# Patient Record
Sex: FEMALE | Race: BLACK OR AFRICAN AMERICAN | NOT HISPANIC OR LATINO | URBAN - METROPOLITAN AREA
[De-identification: names, ages, dates, MRNs, and addresses within clinical notes are randomized per-mention and may not be internally consistent; named-entity substitution may affect disease eponyms.]

---

## 2024-09-10 ENCOUNTER — APPOINTMENT (OUTPATIENT)
Dept: OBSTETRICS AND GYNECOLOGY | Facility: CLINIC | Age: 19
End: 2024-09-10

## 2024-09-12 ENCOUNTER — APPOINTMENT (OUTPATIENT)
Dept: OBSTETRICS AND GYNECOLOGY | Facility: CLINIC | Age: 19
End: 2024-09-12

## 2024-09-25 ENCOUNTER — APPOINTMENT (OUTPATIENT)
Dept: OBSTETRICS AND GYNECOLOGY | Facility: CLINIC | Age: 19
End: 2024-09-25
Payer: COMMERCIAL

## 2024-12-28 ENCOUNTER — APPOINTMENT (OUTPATIENT)
Dept: PRIMARY CARE | Facility: CLINIC | Age: 19
End: 2024-12-28

## 2025-01-16 ENCOUNTER — APPOINTMENT (OUTPATIENT)
Dept: OBSTETRICS AND GYNECOLOGY | Facility: CLINIC | Age: 20
End: 2025-01-16

## 2025-01-16 ENCOUNTER — LAB (OUTPATIENT)
Dept: LAB | Facility: LAB | Age: 20
End: 2025-01-16
Payer: COMMERCIAL

## 2025-01-16 VITALS — BODY MASS INDEX: 20.92 KG/M2 | WEIGHT: 133.6 LBS | SYSTOLIC BLOOD PRESSURE: 106 MMHG | DIASTOLIC BLOOD PRESSURE: 60 MMHG

## 2025-01-16 DIAGNOSIS — Z32.01 PREGNANCY TEST POSITIVE (HHS-HCC): ICD-10-CM

## 2025-01-16 DIAGNOSIS — Z34.02 ENCOUNTER FOR SUPERVISION OF NORMAL FIRST PREGNANCY IN SECOND TRIMESTER: Primary | ICD-10-CM

## 2025-01-16 LAB
ABO GROUP (TYPE) IN BLOOD: NORMAL
ANTIBODY SCREEN: NORMAL
ERYTHROCYTE [DISTWIDTH] IN BLOOD BY AUTOMATED COUNT: 20.7 % (ref 11.5–14.5)
EST. AVERAGE GLUCOSE BLD GHB EST-MCNC: 105 MG/DL
FERRITIN SERPL-MCNC: 23 NG/ML
HBA1C MFR BLD: 5.3 %
HBV SURFACE AG SERPL QL IA: NONREACTIVE
HCT VFR BLD AUTO: 26.4 % (ref 36–46)
HCV AB SER QL: NONREACTIVE
HGB BLD-MCNC: 8.2 G/DL (ref 12–16)
HIV 1+2 AB+HIV1 P24 AG SERPL QL IA: NONREACTIVE
IRON SATN MFR SERPL: NORMAL %
IRON SERPL-MCNC: 26 UG/DL
MCH RBC QN AUTO: 21.2 PG (ref 26–34)
MCHC RBC AUTO-ENTMCNC: 31.1 G/DL (ref 32–36)
MCV RBC AUTO: 68 FL (ref 80–100)
NRBC BLD-RTO: 0 /100 WBCS (ref 0–0)
PLATELET # BLD AUTO: 383 X10*3/UL (ref 150–450)
PREGNANCY TEST URINE, POC: POSITIVE
RBC # BLD AUTO: 3.86 X10*6/UL (ref 4–5.2)
REFLEX ADDED, ANEMIA PANEL: NORMAL
RH FACTOR (ANTIGEN D): NORMAL
RUBV IGG SERPL IA-ACNC: 1.5 IA
RUBV IGG SERPL QL IA: POSITIVE
TIBC SERPL-MCNC: NORMAL UG/DL
TREPONEMA PALLIDUM IGG+IGM AB [PRESENCE] IN SERUM OR PLASMA BY IMMUNOASSAY: NONREACTIVE
UIBC SERPL-MCNC: >450 UG/DL
WBC # BLD AUTO: 12.8 X10*3/UL (ref 4.4–11.3)

## 2025-01-16 PROCEDURE — 81025 URINE PREGNANCY TEST: CPT | Performed by: OBSTETRICS & GYNECOLOGY

## 2025-01-16 PROCEDURE — 86850 RBC ANTIBODY SCREEN: CPT

## 2025-01-16 PROCEDURE — 85027 COMPLETE CBC AUTOMATED: CPT

## 2025-01-16 PROCEDURE — 83550 IRON BINDING TEST: CPT

## 2025-01-16 PROCEDURE — 86803 HEPATITIS C AB TEST: CPT

## 2025-01-16 PROCEDURE — 87186 SC STD MICRODIL/AGAR DIL: CPT

## 2025-01-16 PROCEDURE — 87591 N.GONORRHOEAE DNA AMP PROB: CPT

## 2025-01-16 PROCEDURE — 86780 TREPONEMA PALLIDUM: CPT

## 2025-01-16 PROCEDURE — 83020 HEMOGLOBIN ELECTROPHORESIS: CPT | Performed by: OBSTETRICS & GYNECOLOGY

## 2025-01-16 PROCEDURE — 87491 CHLMYD TRACH DNA AMP PROBE: CPT

## 2025-01-16 PROCEDURE — 87389 HIV-1 AG W/HIV-1&-2 AB AG IA: CPT

## 2025-01-16 PROCEDURE — 83036 HEMOGLOBIN GLYCOSYLATED A1C: CPT

## 2025-01-16 PROCEDURE — 83021 HEMOGLOBIN CHROMOTOGRAPHY: CPT

## 2025-01-16 PROCEDURE — 86901 BLOOD TYPING SEROLOGIC RH(D): CPT

## 2025-01-16 PROCEDURE — 88175 CYTOPATH C/V AUTO FLUID REDO: CPT

## 2025-01-16 PROCEDURE — 82728 ASSAY OF FERRITIN: CPT

## 2025-01-16 PROCEDURE — 0500F INITIAL PRENATAL CARE VISIT: CPT | Performed by: OBSTETRICS & GYNECOLOGY

## 2025-01-16 PROCEDURE — 87086 URINE CULTURE/COLONY COUNT: CPT

## 2025-01-16 PROCEDURE — 86900 BLOOD TYPING SEROLOGIC ABO: CPT

## 2025-01-16 PROCEDURE — 87340 HEPATITIS B SURFACE AG IA: CPT

## 2025-01-16 PROCEDURE — 88141 CYTOPATH C/V INTERPRET: CPT | Performed by: PATHOLOGY

## 2025-01-16 PROCEDURE — 86317 IMMUNOASSAY INFECTIOUS AGENT: CPT

## 2025-01-16 RX ORDER — ASPIRIN 81 MG/1
162 TABLET ORAL DAILY
Qty: 180 TABLET | Refills: 3 | Status: SHIPPED | OUTPATIENT
Start: 2025-01-16 | End: 2026-01-16

## 2025-01-16 ASSESSMENT — EDINBURGH POSTNATAL DEPRESSION SCALE (EPDS)
THE THOUGHT OF HARMING MYSELF HAS OCCURRED TO ME: NEVER
I HAVE FELT SAD OR MISERABLE: NOT VERY OFTEN
I HAVE BEEN SO UNHAPPY THAT I HAVE BEEN CRYING: ONLY OCCASIONALLY
I HAVE FELT SCARED OR PANICKY FOR NO GOOD REASON: YES, SOMETIMES
I HAVE BLAMED MYSELF UNNECESSARILY WHEN THINGS WENT WRONG: YES, SOME OF THE TIME
I HAVE BEEN ABLE TO LAUGH AND SEE THE FUNNY SIDE OF THINGS: AS MUCH AS I ALWAYS COULD
TOTAL SCORE: 9
I HAVE BEEN ANXIOUS OR WORRIED FOR NO GOOD REASON: YES, SOMETIMES
THINGS HAVE BEEN GETTING ON TOP OF ME: NO, MOST OF THE TIME I HAVE COPED QUITE WELL
I HAVE LOOKED FORWARD WITH ENJOYMENT TO THINGS: AS MUCH AS I EVER DID
I HAVE BEEN SO UNHAPPY THAT I HAVE HAD DIFFICULTY SLEEPING: NOT AT ALL

## 2025-01-16 ASSESSMENT — ENCOUNTER SYMPTOMS
DEPRESSION: 0
GASTROINTESTINAL NEGATIVE: 0
OCCASIONAL FEELINGS OF UNSTEADINESS: 0
EYES NEGATIVE: 0
NEUROLOGICAL NEGATIVE: 0
ENDOCRINE NEGATIVE: 0
HEMATOLOGIC/LYMPHATIC NEGATIVE: 0
RESPIRATORY NEGATIVE: 0
LOSS OF SENSATION IN FEET: 0
MUSCULOSKELETAL NEGATIVE: 0
PSYCHIATRIC NEGATIVE: 0
CONSTITUTIONAL NEGATIVE: 0
ALLERGIC/IMMUNOLOGIC NEGATIVE: 0
CARDIOVASCULAR NEGATIVE: 0

## 2025-01-16 NOTE — PATIENT INSTRUCTIONS
Coagulations on your pregnancy!      Review the new OB information provided today.     Return to the office every 4 weeks the beginning parts of pregnancy.  Your visits with close together as the pregnancy progresses.      Arrange to have an ultrasound performed to check your due date/find out how far along you are.      A prescription for prenatal vitamin has been sent to your pharmacy.  Take 1 tablet daily.      A prescription for baby aspirin has been sent to your pharmacy.  Take 2 baby aspirin each day.  This will help decrease the risk of preeclampsia-high blood pressure with protein in your urine in the latter part of pregnancy.      Arrange to have your routine prenatal labs drawn today.      Follow-up with one of our Genetics Counselors about you and your partner's families history of autism, sickle cell disease and your partner's brother born with a heart defect.    Feel free to call the office with any problems, questions or concerns prior to next scheduled visit.

## 2025-01-17 DIAGNOSIS — O99.012 ANEMIA DURING PREGNANCY IN SECOND TRIMESTER (HHS-HCC): Primary | ICD-10-CM

## 2025-01-17 LAB
C TRACH RRNA SPEC QL NAA+PROBE: NEGATIVE
N GONORRHOEA DNA SPEC QL PROBE+SIG AMP: NEGATIVE

## 2025-01-17 RX ORDER — DOCUSATE SODIUM 100 MG/1
100 CAPSULE, LIQUID FILLED ORAL 2 TIMES DAILY PRN
Qty: 30 CAPSULE | Refills: 5 | Status: SHIPPED | OUTPATIENT
Start: 2025-01-17

## 2025-01-17 RX ORDER — FERROUS SULFATE 325(65) MG
325 TABLET, DELAYED RELEASE (ENTERIC COATED) ORAL
Qty: 90 TABLET | Refills: 11 | Status: SHIPPED | OUTPATIENT
Start: 2025-01-17 | End: 2026-01-17

## 2025-01-18 LAB
HEMOGLOBIN A2: 3.4 % (ref 2–3.5)
HEMOGLOBIN A: 62.3 % (ref 95.8–98)
HEMOGLOBIN F: 0.3 % (ref 0–2)
HEMOGLOBIN IDENTIFICATION INTERPRETATION: ABNORMAL
HEMOGLOBIN S: 34 %
PATH REVIEW-HGB IDENTIFICATION: ABNORMAL

## 2025-01-19 DIAGNOSIS — O23.42 URINARY TRACT INFECTION IN MOTHER DURING SECOND TRIMESTER OF PREGNANCY (HHS-HCC): Primary | ICD-10-CM

## 2025-01-19 LAB — BACTERIA UR CULT: ABNORMAL

## 2025-01-19 RX ORDER — NITROFURANTOIN 25; 75 MG/1; MG/1
100 CAPSULE ORAL 2 TIMES DAILY
Qty: 14 CAPSULE | Refills: 0 | Status: SHIPPED | OUTPATIENT
Start: 2025-01-19 | End: 2025-01-26

## 2025-01-20 ENCOUNTER — HOSPITAL ENCOUNTER (OUTPATIENT)
Dept: RADIOLOGY | Facility: CLINIC | Age: 20
Discharge: HOME | End: 2025-01-20
Payer: COMMERCIAL

## 2025-01-20 DIAGNOSIS — Z32.01 PREGNANCY TEST POSITIVE (HHS-HCC): ICD-10-CM

## 2025-01-20 PROCEDURE — 76815 OB US LIMITED FETUS(S): CPT

## 2025-01-20 PROCEDURE — 76815 OB US LIMITED FETUS(S): CPT | Performed by: OBSTETRICS & GYNECOLOGY

## 2025-01-22 LAB
CYTOLOGY CMNT CVX/VAG CYTO-IMP: NORMAL
LAB AP HPV HR: NORMAL
LABORATORY COMMENT REPORT: NORMAL
LMP START DATE: NORMAL
MENSTRUAL HX REPORTED: NORMAL
PATH REPORT.TOTAL CANCER: NORMAL

## 2025-01-24 NOTE — PROGRESS NOTES
20 yo  presents for a NOB visit of a desired pregnancy.     A/P: NOB     -  NOB packet     -  PNL     -  RTC Q 4 weeks     -  D/W the patient our practice     -  PNV rx     -  bASA rx     -  Genetics referral

## 2025-01-29 ENCOUNTER — CLINICAL SUPPORT (OUTPATIENT)
Dept: GENETICS | Facility: CLINIC | Age: 20
End: 2025-01-29
Payer: COMMERCIAL

## 2025-01-29 ENCOUNTER — APPOINTMENT (OUTPATIENT)
Dept: GENETICS | Facility: CLINIC | Age: 20
End: 2025-01-29
Payer: COMMERCIAL

## 2025-01-29 VITALS
BODY MASS INDEX: 20.88 KG/M2 | SYSTOLIC BLOOD PRESSURE: 114 MMHG | HEART RATE: 91 BPM | WEIGHT: 133 LBS | DIASTOLIC BLOOD PRESSURE: 71 MMHG | HEIGHT: 67 IN

## 2025-01-29 DIAGNOSIS — Z31.430 ENCOUNTER OF FEMALE FOR TESTING FOR GENETIC DISEASE CARRIER STATUS FOR PROCREATIVE MANAGEMENT: ICD-10-CM

## 2025-01-29 DIAGNOSIS — D57.3 SICKLE CELL TRAIT (CMS-HCC): ICD-10-CM

## 2025-01-29 DIAGNOSIS — Z32.01 PREGNANCY TEST POSITIVE (HHS-HCC): ICD-10-CM

## 2025-01-29 DIAGNOSIS — Z31.5 ENCOUNTER FOR PROCREATIVE GENETIC COUNSELING: ICD-10-CM

## 2025-01-29 LAB
COMMENTS - MP RESULT TYPE: NORMAL
SCAN RESULT: NORMAL

## 2025-01-29 PROCEDURE — 96041 GENETIC COUNSELING SVC EA 30: CPT

## 2025-01-29 ASSESSMENT — PAIN SCALES - GENERAL: PAINLEVEL_OUTOF10: 0-NO PAIN

## 2025-01-29 NOTE — PROGRESS NOTES
"Thank you for the referral of Ms. Gabby Neves. she is a 19 y.o.,  female who was 16 and 1/7 weeks pregnant at the time of our appointment with an EDC of 07/15/2025.  She was seen for genetic counseling with her partnerfore due to Sickle Cell Trait status, as well as family history concerns. Patient was seen in coordination with HARISH Rodriguez, genetic counseling .     PAST HISTORY:   This is the couple's first pregnancy together. Patient's partner has ADHD, and no other reported health concerns.     Ultrasounds in the current pregnancy:  2025:  \"Assigned GA15 w + 6 d  Assigned KIKA:2025  Fetal Growth Overview  =================  Exam date        GA              BPD (mm)          HC (mm)              AC (mm)          FL (mm)             HL (mm)        EFW (g)  2025        15w 6d        31.8     50%        120.4    42%        94     41%        18.9    35%                             130    26%  Impression  =========  Dating ultrasound was performed.  -Gestational age assigned by fetal biometry as LMP unsure and differs by 7 days.  -The anatomic survey cannot be completely performed at this gestational age, however no gross abnormalities are suspected.  A follow up exam is needed to perform an anatomic survey.     Thank you for allowing us to participate in the care of your patient\"    Prior aneuploidy screening:  Normal cell free DNA screening reported on 2025:      Prior carrier screening:  Abnormal hemoglobin identification, positive for Sickle Cell Trait.  Low MCV (68 fL, ref range  fL) on prior CBC.  No additional carrier screening has been reported.    Patient otherwise denies complications such as bleeding or cramping, exposures, infection/illness, and travel outside of the [place] since finding out she was pregnant.    FAMILY HISTORY  Medical and family histories were reviewed and the following concerns regarding this pregnancy were apparent:  Patient:  -Full " brother (living, 20) with Autism, ADHD, and asthma.  -Maternal half brother and maternal half sister (living) with asthma, ADHD, and depression.  -Mother (living, 48) with anemia and asthma.  -Maternal grandmother (living) with gout and diabetes.  -Maternal grandfather (, 47)  of colon cancer.  Partner:    -Full brother (living, 27) was born with a congenital heart defect. He had surgery to correct this heart defect.  -Maternal half sister (living, 14) has Sickle Cell disease. She had her spleen removed and has ongoing symptoms of Sickle Cell.   -Mother (living, 48) has Sickle Cell trait and ongoing anemia.   -Maternal first cousin has Sickle Cell disease.  -Maternal grandmother (, late 60s) had cancer that was reportedly caused by smoking history.     The remainder of the family history was negative for birth defects, intellectual disability, recurrent pregnancy loss, or recognized inherited conditions.  Consanguinity denied.    REPORTED ETHNICITY/RACE:  Patient:   Patient's partner:      COUNSELING:    The following information was discussed with your patient:    The general population risk of 3-5% for birth defects in every pregnancy.  The patient already had normal prenatal cell-free DNA screening.  We discussed the methodology for this testing, which includes using cell-free DNA obtained from a mother's blood to screen for the presence of common chromosomal abnormalities. Depending on the laboratory used, there is a >99% sensitivity for Down syndrome, at least 97.4% sensitivity for trisomy 18, and at least 87.5% sensitivity for trisomy 13.  Specificity for these trisomies is >99%. Although sensitivity and specificity rates are high, not all positive results are confirmed to be true positives. False negative results are rare.   Sickle Cell trait. Patient has known Sickle Cell trait. We reviewed common features of Sickle Cell Disease, including its autosomal  recessive inheritance. In order for the current pregnancy or any future children to be affected, both parents have to have Sickle Cell trait. Each carrier parent would have a 50% chance of passing down the non-working copy of the gene, and therefore parents who are carriers of the same genetic risks have a 1 in 4 (25%) chance of having an affected child. There is a baseline 1 in 2 risk that Ms. Neves's partner has Sickle Cell trait based on his maternal half sister being affected with Sickle Cell disease. This means that the current pregnancy has a 1 in 8 chance of being affected with the current information.   If her partner tests POSITIVE for Sickle Cell trait, the risk will increase to 25%. If he tests NEGATIVE, it will be reduced to <1%.   Patient requested information from the American Sickle Cell Anemia Association for consideration of free testing for her partner. They are located at [address] [place] [address]. Her partner can set up an appointment for free screening by calling [phone].  The availability of diagnostic fetal chromosome analysis and/or diagnostic single gene analysis via amniocentesis. The methods, benefits, limitations and risks of amniocentesis and a 1 in 400 risk of complications, including a 1 in 800 risk of miscarriage. This is the only way to confirm a diagnosis of a genetic condition prenatally. All children born in [place] are screened for Sickle Cell disease at birth via the [facility] Screen.  Per updated ACOG recommendations from 2017, we discussed the availability, benefits, and limitations of carrier screening for cystic fibrosis (CF), spinal muscular atrophy (SMA), and hemoglobinopathies/thalassemias. We reviewed the carrier frequencies of these conditions, varied clinical manifestations, and their autosomal recessive inheritance. If both members of the couple are found to be carriers for the same condition, there may be a 25% chance for an affected child  and prenatal diagnosis would be available. Negative carrier screening does not rule out the possibility of being a carrier. [place] screening is available for CF, hemoglobinopathies, and thalassemias, and SMA.   In accordance with the most recent carrier screening guidelines from ACMG published in , we also discussed the availability of more expanded carrier screening for additional, primarily autosomal recessive, and some X-linked conditions. Current ACMG guidelines recommend all pregnant patients and those planning a pregnancy should be offered Tier 3 carrier screening. Tier 3 carrier screening includes conditions with a carrier frequency >=1/200 in any ethnic group with reasonable representation in the [place]. Larger panels that include conditions less common than those in Tier 3 are also available, and are considered Tier 4 screening. We discussed the pros and cons of expanded carrier screening including the higher likelihood of being identified as a carrier for at least one condition on a larger panel. Approximately 4% of couples are found to be at risk to have a child with a genetic disorder based on expanded carrier screening. In rare cases, expanded carrier screening results may have health implications for the tested individual.   Additional family history concerns:  Autism. Ms. Neves's brother has Autism. Autism may occur as part of a chromosome abnormality or single gene disorder.  When isolated, it is most often thought to result from a combination of genes and environment, also referred to as multifactorial causes. Without further information it is difficult to predict risk of autism for the current pregnancy, but may be increased above that of the general population. A general genetics evaluation is recommended for any individual with autism and an appointment at [facility] can be considered for the affected relative, and can be made by calling [phone].  If an underlying  genetic etiology in the family is determined, precise recurrence risks could be provided, and testing for other family members may be available if clinically indicated.  Multifactorial inheritance. We reviewed that conditions such as asthma, ADHD, mental health concerns, and later onset cancers are often considered multifactorial, meaning that they are due to some combination of genetic and environmental risk factors. While we do not have specific testing to assess exact risk, we know that relatives of affected individuals have an elevated risk when compared to the general population of being similarly affected. Serenity should inform her doctors, as well as any future pediatricians, of this family history to allow for early intervention and to maximize outcomes.   Young cancer diagnoses in relatives. When an individual is diagnosed with colon cancer before the age of 50, they meet the National Comprehensive Cancer Network (NCCN) guidelines for testing of cancer predisposition genes. First and second degree relatives are also eligible for this testing to determine if there is a genetic predisposition or reason for young cancer diagnoses in a family. The patient may be at increased risk of having a mutation if her family member does. [facility] accepts self-referrals to the Cancer Genetics clinic at  by calling [phone].   Family history of congenital heart defect. We reviewed that heart defects are present in about 1% of people. It is unclear if there is a strong genetic link between patient's partner's brother's diagnosis and a genetic condition given the limited information about the defect. The current pregnancy may have a higher risk when compared to the general population.       DISPOSITION:  The patient stated that she understood the above information and elected to proceed with carrier screening for 14 conditions (including the 5 recommended by ACOG) via [employer]. This panel was  selected as she would like the opportunity to screen for additional conditions in the future if she so desires. [employer] allows a free reflex to additional genes in the future. Patient's partner, Jennifer, also elected to proceed with the 14-gene carrier screening panel. He is unsure if he has active health insurance, so he was also given information on free Sickle Cell screening ONLY through the American Sickle Cell Anemia Association. Results for carrier screening will be available in 2-3 weeks, at which time they will be reviewed with the couple..     Thank you for allowing us to participate in the care of your patient.  Should you or your patient have any questions, please do not hesitate to contact our office at [phone].    Total time spent on day of encounter: 65 minutes (45 minutes with patient, 20 minutes on pre/post patient care activities, including documentation).    Sincerely,   Karyna Bagley MS, [facility]  Licensed and Certified Genetic Counselor    Reviewed by:  Kathi Saunders MD  [facility]

## 2025-02-11 ENCOUNTER — HOSPITAL ENCOUNTER (OUTPATIENT)
Dept: RADIOLOGY | Facility: CLINIC | Age: 20
Discharge: HOME | End: 2025-02-11
Payer: COMMERCIAL

## 2025-02-11 DIAGNOSIS — Z32.01 PREGNANCY TEST POSITIVE (HHS-HCC): ICD-10-CM

## 2025-02-11 PROCEDURE — 76805 OB US >/= 14 WKS SNGL FETUS: CPT

## 2025-02-11 PROCEDURE — 76811 OB US DETAILED SNGL FETUS: CPT

## 2025-02-11 PROCEDURE — 76811 OB US DETAILED SNGL FETUS: CPT | Performed by: OBSTETRICS & GYNECOLOGY

## 2025-02-19 ENCOUNTER — APPOINTMENT (OUTPATIENT)
Dept: OBSTETRICS AND GYNECOLOGY | Facility: CLINIC | Age: 20
End: 2025-02-19
Payer: COMMERCIAL

## 2025-02-19 VITALS — SYSTOLIC BLOOD PRESSURE: 122 MMHG | BODY MASS INDEX: 21.46 KG/M2 | WEIGHT: 137 LBS | DIASTOLIC BLOOD PRESSURE: 70 MMHG

## 2025-02-19 DIAGNOSIS — Z34.02 ENCOUNTER FOR SUPERVISION OF NORMAL FIRST PREGNANCY IN SECOND TRIMESTER: Primary | ICD-10-CM

## 2025-02-19 DIAGNOSIS — O99.012 ANEMIA DURING PREGNANCY IN SECOND TRIMESTER (HHS-HCC): ICD-10-CM

## 2025-02-19 DIAGNOSIS — M54.9 BACK PAIN IN PREGNANCY (HHS-HCC): ICD-10-CM

## 2025-02-19 DIAGNOSIS — O99.891 BACK PAIN IN PREGNANCY (HHS-HCC): ICD-10-CM

## 2025-02-19 PROCEDURE — 0501F PRENATAL FLOW SHEET: CPT | Performed by: OBSTETRICS & GYNECOLOGY

## 2025-02-19 ASSESSMENT — ENCOUNTER SYMPTOMS
NEUROLOGICAL NEGATIVE: 0
MUSCULOSKELETAL NEGATIVE: 0
RESPIRATORY NEGATIVE: 0
PSYCHIATRIC NEGATIVE: 0
HEMATOLOGIC/LYMPHATIC NEGATIVE: 0
CONSTITUTIONAL NEGATIVE: 0
ENDOCRINE NEGATIVE: 0
EYES NEGATIVE: 0
ALLERGIC/IMMUNOLOGIC NEGATIVE: 0
GASTROINTESTINAL NEGATIVE: 0
CARDIOVASCULAR NEGATIVE: 0

## 2025-02-19 NOTE — PROGRESS NOTES
"She reports HA, that have resolved. She is compliant with her iron therapy and bASA. She has noted some constipation.     She works form home 8 hours a day \"sitting in a chair and has back pain and some sciatic pain\".     She has a h/o heavy VB before pregnancy, ?the etiology for severe anemia during pregnancy.     A/P: ALESHIA - late first visit, SCT, back pain, anemia    -  CBC today     -  Continue bASA     -  PT referral     -  Nutrition in pregnancy and list of iron rich foods     -  FOB to get SCT testing     -  SMA and CF testing ordered   "

## 2025-03-12 ENCOUNTER — APPOINTMENT (OUTPATIENT)
Dept: LAB | Facility: HOSPITAL | Age: 20
End: 2025-03-12
Payer: COMMERCIAL

## 2025-03-12 PROCEDURE — 81220 CFTR GENE COM VARIANTS: CPT

## 2025-03-12 PROCEDURE — 81329 SMN1 GENE DOS/DELETION ALYS: CPT

## 2025-03-19 ENCOUNTER — APPOINTMENT (OUTPATIENT)
Dept: OBSTETRICS AND GYNECOLOGY | Facility: CLINIC | Age: 20
End: 2025-03-19
Payer: COMMERCIAL

## 2025-03-19 VITALS — SYSTOLIC BLOOD PRESSURE: 102 MMHG | WEIGHT: 135 LBS | BODY MASS INDEX: 21.14 KG/M2 | DIASTOLIC BLOOD PRESSURE: 68 MMHG

## 2025-03-19 DIAGNOSIS — M54.9 BACK PAIN IN PREGNANCY (HHS-HCC): ICD-10-CM

## 2025-03-19 DIAGNOSIS — O99.012 ANEMIA DURING PREGNANCY IN SECOND TRIMESTER (HHS-HCC): ICD-10-CM

## 2025-03-19 DIAGNOSIS — Z34.02 ENCOUNTER FOR SUPERVISION OF NORMAL FIRST PREGNANCY IN SECOND TRIMESTER: Primary | ICD-10-CM

## 2025-03-19 DIAGNOSIS — O99.012 ANEMIA DURING PREGNANCY IN SECOND TRIMESTER: ICD-10-CM

## 2025-03-19 DIAGNOSIS — O99.891 BACK PAIN IN PREGNANCY (HHS-HCC): ICD-10-CM

## 2025-03-19 DIAGNOSIS — Z34.02 ENCOUNTER FOR SUPERVISION OF NORMAL FIRST PREGNANCY IN SECOND TRIMESTER: ICD-10-CM

## 2025-03-19 LAB
CFTR MUT ANL BLD/T: NORMAL
ELECTRONICALLY SIGNED BY: NORMAL

## 2025-03-19 PROCEDURE — 0501F PRENATAL FLOW SHEET: CPT | Performed by: OBSTETRICS & GYNECOLOGY

## 2025-03-19 ASSESSMENT — ENCOUNTER SYMPTOMS
CARDIOVASCULAR NEGATIVE: 0
EYES NEGATIVE: 0
RESPIRATORY NEGATIVE: 0
ALLERGIC/IMMUNOLOGIC NEGATIVE: 0
PSYCHIATRIC NEGATIVE: 0
HEMATOLOGIC/LYMPHATIC NEGATIVE: 0
ENDOCRINE NEGATIVE: 0
MUSCULOSKELETAL NEGATIVE: 0
NEUROLOGICAL NEGATIVE: 0
CONSTITUTIONAL NEGATIVE: 0
GASTROINTESTINAL NEGATIVE: 0

## 2025-03-19 NOTE — PROGRESS NOTES
She reports lots of fetal movement and she is without symptoms of PTL or PEC.      She was trying to do her second trimester labs but there is a long wait. The lab sent her to the car and then had her come back, but stated that she missed her slot.     She reports compliance with her iron therapy, baby aspirin and prenatal vitamins.  She states that overall she is feeling well.    A/P: ALESHIA - late first visit, anemia, low BMI    -  2nd trimester labs at next visit     -  3rd trimester Check List w/glucose instructions     -  RTC 4 weeks

## 2025-03-20 LAB
ELECTRONICALLY SIGNED BY: NORMAL
SMA RESULT: NORMAL

## 2025-04-16 ENCOUNTER — APPOINTMENT (OUTPATIENT)
Dept: LAB | Facility: HOSPITAL | Age: 20
End: 2025-04-16
Payer: COMMERCIAL

## 2025-04-16 ENCOUNTER — APPOINTMENT (OUTPATIENT)
Dept: OBSTETRICS AND GYNECOLOGY | Facility: CLINIC | Age: 20
End: 2025-04-16
Payer: COMMERCIAL

## 2025-04-16 VITALS — WEIGHT: 142 LBS | BODY MASS INDEX: 22.24 KG/M2 | DIASTOLIC BLOOD PRESSURE: 60 MMHG | SYSTOLIC BLOOD PRESSURE: 120 MMHG

## 2025-04-16 DIAGNOSIS — Z34.02 ENCOUNTER FOR SUPERVISION OF NORMAL FIRST PREGNANCY IN SECOND TRIMESTER: Primary | ICD-10-CM

## 2025-04-16 DIAGNOSIS — M54.9 BACK PAIN IN PREGNANCY (HHS-HCC): ICD-10-CM

## 2025-04-16 DIAGNOSIS — O99.012 ANEMIA DURING PREGNANCY IN SECOND TRIMESTER: ICD-10-CM

## 2025-04-16 DIAGNOSIS — O99.891 BACK PAIN IN PREGNANCY (HHS-HCC): ICD-10-CM

## 2025-04-16 LAB
ERYTHROCYTE [DISTWIDTH] IN BLOOD BY AUTOMATED COUNT: 23 % (ref 11.5–14.5)
FERRITIN SERPL-MCNC: 13 NG/ML
FOLATE SERPL-MCNC: 12 NG/ML
HCT VFR BLD AUTO: 28.4 % (ref 36–46)
HGB BLD-MCNC: 8.4 G/DL (ref 12–16)
IRON SATN MFR SERPL: NORMAL %
IRON SERPL-MCNC: 28 UG/DL
MCH RBC QN AUTO: 24.4 PG (ref 26–34)
MCHC RBC AUTO-ENTMCNC: 29.6 G/DL (ref 32–36)
MCV RBC AUTO: 83 FL (ref 80–100)
NRBC BLD-RTO: 0 /100 WBCS (ref 0–0)
PLATELET # BLD AUTO: 391 X10*3/UL (ref 150–450)
RBC # BLD AUTO: 3.44 X10*6/UL (ref 4–5.2)
REFLEX ADDED, ANEMIA PANEL: NORMAL
TIBC SERPL-MCNC: NORMAL UG/DL
UIBC SERPL-MCNC: >450 UG/DL
VIT B12 SERPL-MCNC: 436 PG/ML
WBC # BLD AUTO: 12.8 X10*3/UL (ref 4.4–11.3)

## 2025-04-16 PROCEDURE — 0501F PRENATAL FLOW SHEET: CPT | Performed by: OBSTETRICS & GYNECOLOGY

## 2025-04-16 PROCEDURE — 82746 ASSAY OF FOLIC ACID SERUM: CPT

## 2025-04-16 PROCEDURE — 83550 IRON BINDING TEST: CPT

## 2025-04-16 PROCEDURE — 82728 ASSAY OF FERRITIN: CPT

## 2025-04-16 PROCEDURE — 82607 VITAMIN B-12: CPT

## 2025-04-16 PROCEDURE — 85027 COMPLETE CBC AUTOMATED: CPT

## 2025-04-16 NOTE — PROGRESS NOTES
"She reports good FM and is w/o sx of PTL or PEC.     She reports resolving mild URI sx and has questions about safe meds to use for \"mucous\".     She has meet with a .     A/P: ALESHIA - anemia, late first visit, resolving URI, low BMI w/fair maternal weight gain    -  RTC 2 weeks     -  Fetal movement info     -  Safe meds in pregnancy info     -  Prenatal tour info     -  [employer]    -  2nd trimester labs today  "

## 2025-04-20 LAB
GLUCOSE 1H P 50 G GLC PO SERPL-MCNC: 77 MG/DL
T PALLIDUM AB SER QL IA: NEGATIVE

## 2025-05-01 ENCOUNTER — APPOINTMENT (OUTPATIENT)
Dept: OBSTETRICS AND GYNECOLOGY | Facility: CLINIC | Age: 20
End: 2025-05-01
Payer: COMMERCIAL

## 2025-05-09 ENCOUNTER — APPOINTMENT (OUTPATIENT)
Dept: OBSTETRICS AND GYNECOLOGY | Facility: CLINIC | Age: 20
End: 2025-05-09
Payer: COMMERCIAL

## 2025-05-09 VITALS — WEIGHT: 146.8 LBS | SYSTOLIC BLOOD PRESSURE: 114 MMHG | BODY MASS INDEX: 22.99 KG/M2 | DIASTOLIC BLOOD PRESSURE: 60 MMHG

## 2025-05-09 DIAGNOSIS — O26.849 FETAL SIZE INCONSISTENT WITH DATES (HHS-HCC): ICD-10-CM

## 2025-05-09 DIAGNOSIS — Z34.03 SUPERVISION OF NORMAL FIRST PREGNANCY IN THIRD TRIMESTER: Primary | ICD-10-CM

## 2025-05-09 DIAGNOSIS — O99.013 ANEMIA DURING PREGNANCY IN THIRD TRIMESTER: ICD-10-CM

## 2025-05-09 PROCEDURE — 0501F PRENATAL FLOW SHEET: CPT | Performed by: OBSTETRICS & GYNECOLOGY

## 2025-05-09 ASSESSMENT — ENCOUNTER SYMPTOMS
NEUROLOGICAL NEGATIVE: 0
HEMATOLOGIC/LYMPHATIC NEGATIVE: 0
GASTROINTESTINAL NEGATIVE: 0
PSYCHIATRIC NEGATIVE: 0
CARDIOVASCULAR NEGATIVE: 0
ENDOCRINE NEGATIVE: 0
ALLERGIC/IMMUNOLOGIC NEGATIVE: 0
RESPIRATORY NEGATIVE: 0
CONSTITUTIONAL NEGATIVE: 0
EYES NEGATIVE: 0
MUSCULOSKELETAL NEGATIVE: 0

## 2025-05-09 NOTE — LETTER
5/9/2025    To Whom It May Concern:    Gabby Neves is being followed for her pregnancy at [facility].  Estimated Date of Delivery: 7/8/25    Sincerely,        Mayte Gonzalez MD  [facility]

## 2025-05-09 NOTE — PROGRESS NOTES
"She reports good FM and is w/o sx of PTL or PEC.     She reports \"getting sick again\". But \"went to an Urgent Care and tested negative for Covid and 2 types of Flu\". She has been using her OTC previously discussed allergy meds intermittently - ie occasional Flonase or Claritin. Her sx have decreased, but persist.     She reports compliance with iron and has \"added some other herbal products to increase her iron.     A/P: HROB - anemia, S<D, low BMI    -  F/U US for growth     -  Fetal movement info     -  3rd tri check list given again and reviewed     -  [facility] given again    -  CBC, will need iron transfusion unless improvement noted    -  Peds info   "

## 2025-05-15 ENCOUNTER — HOSPITAL ENCOUNTER (OUTPATIENT)
Dept: RADIOLOGY | Facility: HOSPITAL | Age: 20
Discharge: HOME | End: 2025-05-15
Payer: COMMERCIAL

## 2025-05-15 ENCOUNTER — LAB (OUTPATIENT)
Dept: LAB | Facility: HOSPITAL | Age: 20
End: 2025-05-15
Payer: COMMERCIAL

## 2025-05-15 DIAGNOSIS — O26.849 FETAL SIZE INCONSISTENT WITH DATES (HHS-HCC): ICD-10-CM

## 2025-05-15 DIAGNOSIS — O99.013 ANEMIA COMPLICATING PREGNANCY, THIRD TRIMESTER: Primary | ICD-10-CM

## 2025-05-15 LAB
ERYTHROCYTE [DISTWIDTH] IN BLOOD BY AUTOMATED COUNT: 20.3 % (ref 11.5–14.5)
FERRITIN SERPL-MCNC: 15 NG/ML
FOLATE SERPL-MCNC: >24 NG/ML
HCT VFR BLD AUTO: 29.5 % (ref 36–46)
HGB BLD-MCNC: 8.9 G/DL (ref 12–16)
IRON SATN MFR SERPL: 37 %
IRON SERPL-MCNC: 209 UG/DL
MCH RBC QN AUTO: 25.6 PG (ref 26–34)
MCHC RBC AUTO-ENTMCNC: 30.2 G/DL (ref 32–36)
MCV RBC AUTO: 85 FL (ref 80–100)
NRBC BLD-RTO: 0 /100 WBCS (ref 0–0)
PLATELET # BLD AUTO: 323 X10*3/UL (ref 150–450)
RBC # BLD AUTO: 3.48 X10*6/UL (ref 4–5.2)
REFLEX ADDED, ANEMIA PANEL: NORMAL
TIBC SERPL-MCNC: 572 UG/DL
UIBC SERPL-MCNC: 363 UG/DL
VIT B12 SERPL-MCNC: 380 PG/ML
WBC # BLD AUTO: 11.3 X10*3/UL (ref 4.4–11.3)

## 2025-05-15 PROCEDURE — 83550 IRON BINDING TEST: CPT

## 2025-05-15 PROCEDURE — 36415 COLL VENOUS BLD VENIPUNCTURE: CPT

## 2025-05-15 PROCEDURE — 82728 ASSAY OF FERRITIN: CPT

## 2025-05-15 PROCEDURE — 82607 VITAMIN B-12: CPT

## 2025-05-15 PROCEDURE — 82746 ASSAY OF FOLIC ACID SERUM: CPT

## 2025-05-15 PROCEDURE — 85027 COMPLETE CBC AUTOMATED: CPT

## 2025-05-15 PROCEDURE — 76816 OB US FOLLOW-UP PER FETUS: CPT

## 2025-05-19 ENCOUNTER — APPOINTMENT (OUTPATIENT)
Dept: OBSTETRICS AND GYNECOLOGY | Facility: CLINIC | Age: 20
End: 2025-05-19
Payer: COMMERCIAL

## 2025-05-19 VITALS — DIASTOLIC BLOOD PRESSURE: 60 MMHG | SYSTOLIC BLOOD PRESSURE: 100 MMHG | WEIGHT: 146.9 LBS | BODY MASS INDEX: 23.01 KG/M2

## 2025-05-19 DIAGNOSIS — O36.5930 POOR FETAL GROWTH AFFECTING MANAGEMENT OF MOTHER IN THIRD TRIMESTER, SINGLE OR UNSPECIFIED FETUS (HHS-HCC): ICD-10-CM

## 2025-05-19 DIAGNOSIS — O09.93: Primary | ICD-10-CM

## 2025-05-19 PROCEDURE — 0501F PRENATAL FLOW SHEET: CPT | Performed by: OBSTETRICS & GYNECOLOGY

## 2025-05-19 NOTE — PROGRESS NOTES
She reports lots of FM and is w/o sx of PTL or PEC. She reports compliance with po iron 2x/day.     She is starting to get ready for delivery. She toured the [facility] and has a tour of [employer] scheduled.     A/P: HROB - anemia, FGR, low BMI, late first visit    -  RTC Q week for testing     -  Fetal movement d/w the patient     -  Q 3 week F/U US for growth       -  Iron infusion d/w the patient

## 2025-05-23 ENCOUNTER — HOSPITAL ENCOUNTER (OUTPATIENT)
Dept: RADIOLOGY | Facility: HOSPITAL | Age: 20
Discharge: HOME | End: 2025-05-23
Payer: COMMERCIAL

## 2025-05-23 DIAGNOSIS — O26.849 FETAL SIZE INCONSISTENT WITH DATES (HHS-HCC): ICD-10-CM

## 2025-05-23 PROCEDURE — 76815 OB US LIMITED FETUS(S): CPT

## 2025-05-23 PROCEDURE — 76820 UMBILICAL ARTERY ECHO: CPT

## 2025-05-28 ENCOUNTER — APPOINTMENT (OUTPATIENT)
Dept: OBSTETRICS AND GYNECOLOGY | Facility: CLINIC | Age: 20
End: 2025-05-28
Payer: COMMERCIAL

## 2025-05-28 VITALS — BODY MASS INDEX: 23.81 KG/M2 | SYSTOLIC BLOOD PRESSURE: 102 MMHG | WEIGHT: 152 LBS | DIASTOLIC BLOOD PRESSURE: 60 MMHG

## 2025-05-28 DIAGNOSIS — M54.9 BACK PAIN IN PREGNANCY (HHS-HCC): ICD-10-CM

## 2025-05-28 DIAGNOSIS — O09.93: Primary | ICD-10-CM

## 2025-05-28 DIAGNOSIS — O36.5930 POOR FETAL GROWTH AFFECTING MANAGEMENT OF MOTHER IN THIRD TRIMESTER, SINGLE OR UNSPECIFIED FETUS (HHS-HCC): ICD-10-CM

## 2025-05-28 DIAGNOSIS — O99.891 BACK PAIN IN PREGNANCY (HHS-HCC): ICD-10-CM

## 2025-05-28 DIAGNOSIS — O99.013 ANEMIA DURING PREGNANCY IN THIRD TRIMESTER: ICD-10-CM

## 2025-05-28 PROCEDURE — 0501F PRENATAL FLOW SHEET: CPT | Performed by: OBSTETRICS & GYNECOLOGY

## 2025-05-28 ASSESSMENT — ENCOUNTER SYMPTOMS
CONSTITUTIONAL NEGATIVE: 0
MUSCULOSKELETAL NEGATIVE: 0
NEUROLOGICAL NEGATIVE: 0
PSYCHIATRIC NEGATIVE: 0
ENDOCRINE NEGATIVE: 0
ALLERGIC/IMMUNOLOGIC NEGATIVE: 0
GASTROINTESTINAL NEGATIVE: 0
HEMATOLOGIC/LYMPHATIC NEGATIVE: 0
CARDIOVASCULAR NEGATIVE: 0
EYES NEGATIVE: 0
RESPIRATORY NEGATIVE: 0

## 2025-05-30 ENCOUNTER — APPOINTMENT (OUTPATIENT)
Dept: RADIOLOGY | Facility: HOSPITAL | Age: 20
End: 2025-05-30
Payer: COMMERCIAL

## 2025-06-01 NOTE — PROGRESS NOTES
She reports good FM and is w/o sx of PTL or PEC.     She is ready for delivery.     A/P: HROB - FGR, anemia    -  RTC Q week     -  Weekly BPP, AFV and dopplers    -  IOL by 39 weeks    -  Recheck CBC at next visit

## 2025-06-10 ENCOUNTER — APPOINTMENT (OUTPATIENT)
Dept: RADIOLOGY | Facility: CLINIC | Age: 20
End: 2025-06-10
Payer: COMMERCIAL

## 2025-06-11 ENCOUNTER — HOSPITAL ENCOUNTER (OUTPATIENT)
Dept: RADIOLOGY | Facility: CLINIC | Age: 20
Discharge: HOME | End: 2025-06-11
Payer: COMMERCIAL

## 2025-06-11 DIAGNOSIS — O26.849 FETAL SIZE INCONSISTENT WITH DATES (HHS-HCC): ICD-10-CM

## 2025-06-11 PROCEDURE — 76816 OB US FOLLOW-UP PER FETUS: CPT

## 2025-06-11 PROCEDURE — 76820 UMBILICAL ARTERY ECHO: CPT

## 2025-06-11 PROCEDURE — 76820 UMBILICAL ARTERY ECHO: CPT | Performed by: OBSTETRICS & GYNECOLOGY

## 2025-06-11 PROCEDURE — 76816 OB US FOLLOW-UP PER FETUS: CPT | Performed by: OBSTETRICS & GYNECOLOGY

## 2025-06-11 PROCEDURE — 76819 FETAL BIOPHYS PROFIL W/O NST: CPT | Performed by: OBSTETRICS & GYNECOLOGY

## 2025-06-12 ENCOUNTER — APPOINTMENT (OUTPATIENT)
Dept: OBSTETRICS AND GYNECOLOGY | Facility: CLINIC | Age: 20
End: 2025-06-12
Payer: COMMERCIAL

## 2025-06-12 ENCOUNTER — LAB (OUTPATIENT)
Dept: LAB | Facility: HOSPITAL | Age: 20
End: 2025-06-12
Payer: COMMERCIAL

## 2025-06-12 VITALS — SYSTOLIC BLOOD PRESSURE: 120 MMHG | BODY MASS INDEX: 23.81 KG/M2 | DIASTOLIC BLOOD PRESSURE: 82 MMHG | WEIGHT: 152 LBS

## 2025-06-12 DIAGNOSIS — O99.891 BACK PAIN IN PREGNANCY (HHS-HCC): ICD-10-CM

## 2025-06-12 DIAGNOSIS — M54.9 BACK PAIN IN PREGNANCY (HHS-HCC): ICD-10-CM

## 2025-06-12 DIAGNOSIS — O99.013 ANEMIA DURING PREGNANCY IN THIRD TRIMESTER: ICD-10-CM

## 2025-06-12 DIAGNOSIS — O09.93: Primary | ICD-10-CM

## 2025-06-12 DIAGNOSIS — O99.013 ANEMIA COMPLICATING PREGNANCY, THIRD TRIMESTER: Primary | ICD-10-CM

## 2025-06-12 DIAGNOSIS — O36.5930 POOR FETAL GROWTH AFFECTING MANAGEMENT OF MOTHER IN THIRD TRIMESTER, SINGLE OR UNSPECIFIED FETUS (HHS-HCC): ICD-10-CM

## 2025-06-12 LAB
ERYTHROCYTE [DISTWIDTH] IN BLOOD BY AUTOMATED COUNT: 18.9 % (ref 11.5–14.5)
FERRITIN SERPL-MCNC: 21 NG/ML
FOLATE SERPL-MCNC: >24 NG/ML
HCT VFR BLD AUTO: 30.9 % (ref 36–46)
HGB BLD-MCNC: 9.9 G/DL (ref 12–16)
IRON SATN MFR SERPL: 41 %
IRON SERPL-MCNC: 230 UG/DL
MCH RBC QN AUTO: 27.3 PG (ref 26–34)
MCHC RBC AUTO-ENTMCNC: 32 G/DL (ref 32–36)
MCV RBC AUTO: 85 FL (ref 80–100)
NRBC BLD-RTO: 0.2 /100 WBCS (ref 0–0)
PLATELET # BLD AUTO: 295 X10*3/UL (ref 150–450)
RBC # BLD AUTO: 3.63 X10*6/UL (ref 4–5.2)
REFLEX ADDED, ANEMIA PANEL: NORMAL
TIBC SERPL-MCNC: 561 UG/DL
UIBC SERPL-MCNC: 331 UG/DL
VIT B12 SERPL-MCNC: 365 PG/ML
WBC # BLD AUTO: 11.1 X10*3/UL (ref 4.4–11.3)

## 2025-06-12 PROCEDURE — 83550 IRON BINDING TEST: CPT

## 2025-06-12 PROCEDURE — 82728 ASSAY OF FERRITIN: CPT

## 2025-06-12 PROCEDURE — 85027 COMPLETE CBC AUTOMATED: CPT

## 2025-06-12 PROCEDURE — 0501F PRENATAL FLOW SHEET: CPT | Performed by: OBSTETRICS & GYNECOLOGY

## 2025-06-12 PROCEDURE — 36415 COLL VENOUS BLD VENIPUNCTURE: CPT

## 2025-06-12 PROCEDURE — 82746 ASSAY OF FOLIC ACID SERUM: CPT

## 2025-06-12 PROCEDURE — 82607 VITAMIN B-12: CPT

## 2025-06-12 ASSESSMENT — PATIENT HEALTH QUESTIONNAIRE - PHQ9
1. LITTLE INTEREST OR PLEASURE IN DOING THINGS: NOT AT ALL
2. FEELING DOWN, DEPRESSED OR HOPELESS: NOT AT ALL
SUM OF ALL RESPONSES TO PHQ9 QUESTIONS 1 & 2: 0

## 2025-06-12 NOTE — PROGRESS NOTES
She reports good fetal movement.  She is without symptoms of  labor or preeclampsia.    She reports compliance with her iron therapy.  However, we discussed an iron infusion if her level has not significantly improved.    She is ready for delivery.  She has questions about taking CB classes.    A/P: HROB - FGR, anemia     -  Weekly BPP and dopplers    -  Fetal movement     -  CBC today     -  GBS today, if not significant improvement will do iron infusion    -  Car seat check info     -  CB class and [employer] info

## 2025-06-13 ENCOUNTER — TELEPHONE (OUTPATIENT)
Dept: OBSTETRICS AND GYNECOLOGY | Facility: CLINIC | Age: 20
End: 2025-06-13
Payer: COMMERCIAL

## 2025-06-13 ENCOUNTER — DOCUMENTATION (OUTPATIENT)
Dept: OBSTETRICS AND GYNECOLOGY | Facility: CLINIC | Age: 20
End: 2025-06-13
Payer: COMMERCIAL

## 2025-06-13 DIAGNOSIS — O99.013 ANEMIA AFFECTING PREGNANCY IN THIRD TRIMESTER: Primary | ICD-10-CM

## 2025-06-13 RX ORDER — HEPARIN SODIUM,PORCINE/PF 10 UNIT/ML
50 SYRINGE (ML) INTRAVENOUS AS NEEDED
OUTPATIENT
Start: 2025-06-13

## 2025-06-13 RX ORDER — FAMOTIDINE 10 MG/ML
20 INJECTION, SOLUTION INTRAVENOUS ONCE AS NEEDED
OUTPATIENT
Start: 2025-06-13

## 2025-06-13 RX ORDER — ALBUTEROL SULFATE 0.83 MG/ML
3 SOLUTION RESPIRATORY (INHALATION) AS NEEDED
OUTPATIENT
Start: 2025-06-13

## 2025-06-13 RX ORDER — HEPARIN 100 UNIT/ML
500 SYRINGE INTRAVENOUS AS NEEDED
OUTPATIENT
Start: 2025-06-13

## 2025-06-13 RX ORDER — DIPHENHYDRAMINE HYDROCHLORIDE 50 MG/ML
50 INJECTION, SOLUTION INTRAMUSCULAR; INTRAVENOUS AS NEEDED
OUTPATIENT
Start: 2025-06-13

## 2025-06-13 RX ORDER — EPINEPHRINE 0.3 MG/.3ML
0.3 INJECTION SUBCUTANEOUS EVERY 5 MIN PRN
OUTPATIENT
Start: 2025-06-13

## 2025-06-13 NOTE — TELEPHONE ENCOUNTER
Pt verified by name and .  Pt is aware Dr. Gonzalez is recommending an iron infusion.  Pt is aware [facility] will be reaching out to her.  Pt has no questions at this time.

## 2025-06-14 LAB — GP B STREP SPEC QL CULT: ABNORMAL

## 2025-06-16 ENCOUNTER — APPOINTMENT (OUTPATIENT)
Dept: OBSTETRICS AND GYNECOLOGY | Facility: CLINIC | Age: 20
End: 2025-06-16
Payer: COMMERCIAL

## 2025-06-16 VITALS — BODY MASS INDEX: 23.7 KG/M2 | WEIGHT: 151.3 LBS | DIASTOLIC BLOOD PRESSURE: 65 MMHG | SYSTOLIC BLOOD PRESSURE: 100 MMHG

## 2025-06-16 DIAGNOSIS — O36.5930 POOR FETAL GROWTH AFFECTING MANAGEMENT OF MOTHER IN THIRD TRIMESTER, SINGLE OR UNSPECIFIED FETUS (HHS-HCC): ICD-10-CM

## 2025-06-16 DIAGNOSIS — O09.93: Primary | ICD-10-CM

## 2025-06-16 DIAGNOSIS — O99.891 BACK PAIN IN PREGNANCY (HHS-HCC): ICD-10-CM

## 2025-06-16 DIAGNOSIS — O99.013 ANEMIA DURING PREGNANCY IN THIRD TRIMESTER: ICD-10-CM

## 2025-06-16 DIAGNOSIS — M54.9 BACK PAIN IN PREGNANCY (HHS-HCC): ICD-10-CM

## 2025-06-16 PROCEDURE — 0501F PRENATAL FLOW SHEET: CPT | Performed by: OBSTETRICS & GYNECOLOGY

## 2025-06-16 NOTE — PROGRESS NOTES
She reports good FM and is w/o sx of PTL or PEC. She has questions about her GBS+ cx     A/P: HROB - FGR, anemia     -  RTC Q week     -  Weekly BPP and dopplers     -  Iron infusion ordered     -  39 week IOL at [facility]     -  GBS d/w the patient

## 2025-06-20 ENCOUNTER — HOSPITAL ENCOUNTER (OUTPATIENT)
Dept: RADIOLOGY | Facility: CLINIC | Age: 20
Discharge: HOME | End: 2025-06-20
Payer: COMMERCIAL

## 2025-06-20 DIAGNOSIS — O36.5930 INTRAUTERINE GROWTH RESTRICTION (IUGR) AFFECTING CARE OF MOTHER, THIRD TRIMESTER, SINGLE GESTATION: ICD-10-CM

## 2025-06-20 DIAGNOSIS — O26.849 FETAL SIZE INCONSISTENT WITH DATES (HHS-HCC): ICD-10-CM

## 2025-06-20 PROCEDURE — 76819 FETAL BIOPHYS PROFIL W/O NST: CPT

## 2025-06-20 PROCEDURE — 76815 OB US LIMITED FETUS(S): CPT

## 2025-06-26 ENCOUNTER — INFUSION (OUTPATIENT)
Dept: INFUSION THERAPY | Facility: HOSPITAL | Age: 20
End: 2025-06-26
Payer: COMMERCIAL

## 2025-06-26 VITALS
RESPIRATION RATE: 18 BRPM | WEIGHT: 155 LBS | DIASTOLIC BLOOD PRESSURE: 76 MMHG | TEMPERATURE: 97.2 F | SYSTOLIC BLOOD PRESSURE: 114 MMHG | OXYGEN SATURATION: 98 % | HEIGHT: 67 IN | HEART RATE: 80 BPM | BODY MASS INDEX: 24.33 KG/M2

## 2025-06-26 DIAGNOSIS — O09.93: ICD-10-CM

## 2025-06-26 DIAGNOSIS — O99.891 BACK PAIN IN PREGNANCY (HHS-HCC): ICD-10-CM

## 2025-06-26 DIAGNOSIS — O36.5931 POOR FETAL GROWTH AFFECTING MANAGEMENT OF MOTHER IN THIRD TRIMESTER, FETUS 1 OF MULTIPLE GESTATION (HHS-HCC): ICD-10-CM

## 2025-06-26 DIAGNOSIS — M54.9 BACK PAIN IN PREGNANCY (HHS-HCC): ICD-10-CM

## 2025-06-26 DIAGNOSIS — O99.013 ANEMIA AFFECTING PREGNANCY IN THIRD TRIMESTER: ICD-10-CM

## 2025-06-26 PROCEDURE — 96366 THER/PROPH/DIAG IV INF ADDON: CPT

## 2025-06-26 PROCEDURE — 96365 THER/PROPH/DIAG IV INF INIT: CPT

## 2025-06-26 PROCEDURE — 2500000004 HC RX 250 GENERAL PHARMACY W/ HCPCS (ALT 636 FOR OP/ED): Mod: JZ,TB | Performed by: OBSTETRICS & GYNECOLOGY

## 2025-06-26 RX ORDER — EPINEPHRINE 0.3 MG/.3ML
0.3 INJECTION SUBCUTANEOUS EVERY 5 MIN PRN
OUTPATIENT
Start: 2025-06-26

## 2025-06-26 RX ORDER — FAMOTIDINE 10 MG/ML
20 INJECTION, SOLUTION INTRAVENOUS ONCE AS NEEDED
OUTPATIENT
Start: 2025-06-26

## 2025-06-26 RX ORDER — ALBUTEROL SULFATE 0.83 MG/ML
3 SOLUTION RESPIRATORY (INHALATION) AS NEEDED
OUTPATIENT
Start: 2025-06-26

## 2025-06-26 RX ORDER — DIPHENHYDRAMINE HYDROCHLORIDE 50 MG/ML
50 INJECTION, SOLUTION INTRAMUSCULAR; INTRAVENOUS AS NEEDED
OUTPATIENT
Start: 2025-06-26

## 2025-06-26 RX ADMIN — SODIUM CHLORIDE 25 MG: 9 INJECTION, SOLUTION INTRAVENOUS at 10:07

## 2025-06-26 RX ADMIN — SODIUM CHLORIDE 975 MG: 0.9 INJECTION, SOLUTION INTRAVENOUS at 10:27

## 2025-06-26 ASSESSMENT — PAIN SCALES - GENERAL
PAINLEVEL_OUTOF10: 0-NO PAIN

## 2025-06-26 NOTE — PROGRESS NOTES
"[facility] Infusion Clinic Note   Date: 2025   Name: Gabby Neves  : 2005   MRN: [id number]         Reason for Visit: IRON DEXTRAN VISIT      Accompanied by:Self   Visit Type:: Infusion   Diagnosis: Anemia affecting pregnancy in third trimester    Pregnancy, high-risk, third trimester (HHS-HCC)    Poor fetal growth affecting management of mother in third trimester, fetus 1 of multiple gestation (HHS-HCC)    Back pain in pregnancy (HHS-HCC)    Allergies:   Allergies as of 2025    (No Known Allergies)      Current [facility] has a current medication list which includes the following prescription(s): aspirin, docusate sodium, and ferrous sulfate, and the following Facility-Administered Medications: iron dextran complex (Infed) 975 mg in sodium chloride 0.9% 500 mL IV.        Vitals:  Vitals:    25 0845 25 1012 25 1018 25 1022   BP: 112/73 107/72 112/72 118/73   BP Location: Left arm Left arm Left arm Left arm   Patient Position: Sitting Sitting Sitting Sitting   BP Cuff Size: Large adult long Adult Adult Large adult   Pulse: 69 62 64 66   Resp: 18 18 18 18   Temp: 36.6 °C (97.8 °F) 36.3 °C (97.3 °F) 36.4 °C (97.6 °F) 36.5 °C (97.7 °F)   TempSrc: Temporal Temporal Temporal Temporal   SpO2: 98% 99% 98% 98%   Weight: 70.3 kg (155 lb)      Height: 1.702 m (5' 7\")         Infusion Pre-procedure Checklist   Allergies reviewed: YES   Medications reviewed: yes   Contraindications to treatment:NO   Previous reaction to current treatment:No   Current Health Issues: None and Pregnancy   Pain: 0-no pain [0]'    Contraindications based on patient history: No   Provider notified: No      Steadi Fall Risk  One or more falls in the last year? No  How many Times?    Was the patient injured in the fall?    Has trouble stepping onto curb?    Advised to use a cane or walker to get around safely?    Often has to rush to toilet?    Feels unsteady when walking?  "   Has lost some feeling in feet?    Often feels sad or depressed?    Steadies self on furniture while walking at home?    Takes medicine that makes them feel lightheaded or more tired than usual?    Worried about Falling?    Takes medicine to sleep or improve mood?    Needs to push with hands when rising from a chair?        Negative for complaint: [] all other systems have been reviewed and are negative for complaint   Infusion Readiness:   Assessment Concerns Related to Infusion: No  Provider notified: yes Dr Guallpa  Assess patient for the concerns below. Document provider notification as appropriate:  - Does not meet criteria to treat No  - Has an active or recent infection with/without current antibiotic use No  - Has recent/planned dental work No  - Has recent/planned surgeries No  - Has recently received or plans to receive vaccinations No  - Has treatment related toxicities No  - Is pregnant (unless noted otherwise) Yes      We administered iron dextran complex (Infed) 25 mg in sodium chloride 0.9% 50 mL IV and iron dextran complex (Infed) 975 mg in sodium chloride 0.9% 500 mL IV.     Patient remained in office for 30 additional minutes after infusion stopped to assess for reaction.   Patient remained stable the entire 30 minutes.   Patient tolerated infusion well, no reactions or complications.   Patient discharged home ambulatory.

## 2025-06-27 ENCOUNTER — HOSPITAL ENCOUNTER (OUTPATIENT)
Dept: RADIOLOGY | Facility: CLINIC | Age: 20
Discharge: HOME | End: 2025-06-27
Payer: COMMERCIAL

## 2025-06-27 DIAGNOSIS — O36.5990 FETAL GROWTH RESTRICTION ANTEPARTUM (HHS-HCC): ICD-10-CM

## 2025-06-27 DIAGNOSIS — Z32.00 VISIT FOR CONFIRMATION OF PREGNANCY TEST RESULT WITH PHYSICAL EXAM: ICD-10-CM

## 2025-06-27 DIAGNOSIS — O99.013 ANEMIA IN PREGNANCY, THIRD TRIMESTER: ICD-10-CM

## 2025-06-27 PROCEDURE — 76815 OB US LIMITED FETUS(S): CPT

## 2025-06-27 PROCEDURE — 76820 UMBILICAL ARTERY ECHO: CPT

## 2025-06-30 ENCOUNTER — TELEPHONE (OUTPATIENT)
Dept: OBSTETRICS AND GYNECOLOGY | Facility: CLINIC | Age: 20
End: 2025-06-30

## 2025-06-30 ENCOUNTER — APPOINTMENT (OUTPATIENT)
Dept: OBSTETRICS AND GYNECOLOGY | Facility: CLINIC | Age: 20
End: 2025-06-30
Payer: COMMERCIAL

## 2025-06-30 VITALS — SYSTOLIC BLOOD PRESSURE: 115 MMHG | WEIGHT: 158 LBS | DIASTOLIC BLOOD PRESSURE: 75 MMHG | BODY MASS INDEX: 24.75 KG/M2

## 2025-06-30 DIAGNOSIS — O09.93: Primary | ICD-10-CM

## 2025-06-30 DIAGNOSIS — K62.5 RECTAL BLEEDING: ICD-10-CM

## 2025-06-30 DIAGNOSIS — O99.013 ANEMIA DURING PREGNANCY IN THIRD TRIMESTER: ICD-10-CM

## 2025-06-30 DIAGNOSIS — O36.5930 POOR FETAL GROWTH AFFECTING MANAGEMENT OF MOTHER IN THIRD TRIMESTER, SINGLE OR UNSPECIFIED FETUS (HHS-HCC): ICD-10-CM

## 2025-06-30 DIAGNOSIS — Z34.90 ENCOUNTER FOR INDUCTION OF LABOR: ICD-10-CM

## 2025-06-30 DIAGNOSIS — M54.9 BACK PAIN IN PREGNANCY (HHS-HCC): ICD-10-CM

## 2025-06-30 DIAGNOSIS — O99.891 BACK PAIN IN PREGNANCY (HHS-HCC): ICD-10-CM

## 2025-06-30 PROCEDURE — 59426 ANTEPARTUM CARE ONLY: CPT | Performed by: OBSTETRICS & GYNECOLOGY

## 2025-06-30 NOTE — TELEPHONE ENCOUNTER
Pt verified by name and .  Pt is aware her IOL is scheduled on 2025 at 6 PM at [facility].  Pt requesting Thursday or Friday.  Pt is aware nurse will call surgery scheduling.  Per surgery scheduling there are no openings.  Nurse left message for pt to return call.

## 2025-07-01 ENCOUNTER — PREP FOR PROCEDURE (OUTPATIENT)
Dept: OBSTETRICS AND GYNECOLOGY | Facility: CLINIC | Age: 20
End: 2025-07-01
Payer: COMMERCIAL

## 2025-07-01 DIAGNOSIS — O36.5930 POOR FETAL GROWTH AFFECTING MANAGEMENT OF MOTHER IN THIRD TRIMESTER, SINGLE OR UNSPECIFIED FETUS (HHS-HCC): ICD-10-CM

## 2025-07-01 DIAGNOSIS — O09.93: Primary | ICD-10-CM

## 2025-07-01 DIAGNOSIS — O99.013 ANEMIA DURING PREGNANCY IN THIRD TRIMESTER: ICD-10-CM

## 2025-07-05 ENCOUNTER — HOSPITAL ENCOUNTER (INPATIENT)
Facility: HOSPITAL | Age: 20
LOS: 1 days | Discharge: HOME | End: 2025-07-06
Attending: OBSTETRICS & GYNECOLOGY | Admitting: ADVANCED PRACTICE MIDWIFE
Payer: COMMERCIAL

## 2025-07-05 PROBLEM — Z3A.39 39 WEEKS GESTATION OF PREGNANCY (HHS-HCC): Status: ACTIVE | Noted: 2025-07-05

## 2025-07-05 LAB
ABO GROUP (TYPE) IN BLOOD: NORMAL
ANTIBODY SCREEN: NORMAL
ERYTHROCYTE [DISTWIDTH] IN BLOOD BY AUTOMATED COUNT: 18.8 % (ref 11.5–14.5)
HCT VFR BLD AUTO: 33.9 % (ref 36–46)
HGB BLD-MCNC: 11.4 G/DL (ref 12–16)
MCH RBC QN AUTO: 28.6 PG (ref 26–34)
MCHC RBC AUTO-ENTMCNC: 33.6 G/DL (ref 32–36)
MCV RBC AUTO: 85 FL (ref 80–100)
NRBC BLD-RTO: 0 /100 WBCS (ref 0–0)
PLATELET # BLD AUTO: 265 X10*3/UL (ref 150–450)
RBC # BLD AUTO: 3.98 X10*6/UL (ref 4–5.2)
RH FACTOR (ANTIGEN D): NORMAL
WBC # BLD AUTO: 17.9 X10*3/UL (ref 4.4–11.3)

## 2025-07-05 PROCEDURE — 0HQ9XZZ REPAIR PERINEUM SKIN, EXTERNAL APPROACH: ICD-10-PCS | Performed by: OBSTETRICS & GYNECOLOGY

## 2025-07-05 PROCEDURE — 86850 RBC ANTIBODY SCREEN: CPT | Performed by: ADVANCED PRACTICE MIDWIFE

## 2025-07-05 PROCEDURE — 59410 OBSTETRICAL CARE: CPT | Performed by: OBSTETRICS & GYNECOLOGY

## 2025-07-05 PROCEDURE — 1100000001 HC PRIVATE ROOM DAILY

## 2025-07-05 PROCEDURE — 86780 TREPONEMA PALLIDUM: CPT | Mod: AHULAB | Performed by: ADVANCED PRACTICE MIDWIFE

## 2025-07-05 PROCEDURE — 2500000001 HC RX 250 WO HCPCS SELF ADMINISTERED DRUGS (ALT 637 FOR MEDICARE OP): Performed by: ADVANCED PRACTICE MIDWIFE

## 2025-07-05 PROCEDURE — 86901 BLOOD TYPING SEROLOGIC RH(D): CPT | Performed by: ADVANCED PRACTICE MIDWIFE

## 2025-07-05 PROCEDURE — 59409 OBSTETRICAL CARE: CPT | Performed by: OBSTETRICS & GYNECOLOGY

## 2025-07-05 PROCEDURE — 2500000005 HC RX 250 GENERAL PHARMACY W/O HCPCS: Performed by: ADVANCED PRACTICE MIDWIFE

## 2025-07-05 PROCEDURE — 36415 COLL VENOUS BLD VENIPUNCTURE: CPT | Performed by: ADVANCED PRACTICE MIDWIFE

## 2025-07-05 PROCEDURE — 85027 COMPLETE CBC AUTOMATED: CPT | Performed by: ADVANCED PRACTICE MIDWIFE

## 2025-07-05 RX ORDER — LOPERAMIDE HYDROCHLORIDE 2 MG/1
4 CAPSULE ORAL EVERY 2 HOUR PRN
Status: DISCONTINUED | OUTPATIENT
Start: 2025-07-05 | End: 2025-07-06 | Stop reason: HOSPADM

## 2025-07-05 RX ORDER — METHYLERGONOVINE MALEATE 0.2 MG/ML
0.2 INJECTION INTRAVENOUS ONCE AS NEEDED
Status: DISCONTINUED | OUTPATIENT
Start: 2025-07-05 | End: 2025-07-06 | Stop reason: HOSPADM

## 2025-07-05 RX ORDER — LOPERAMIDE HYDROCHLORIDE 2 MG/1
4 CAPSULE ORAL EVERY 2 HOUR PRN
Status: DISCONTINUED | OUTPATIENT
Start: 2025-07-05 | End: 2025-07-05

## 2025-07-05 RX ORDER — HYDRALAZINE HYDROCHLORIDE 20 MG/ML
5 INJECTION INTRAMUSCULAR; INTRAVENOUS ONCE AS NEEDED
Status: DISCONTINUED | OUTPATIENT
Start: 2025-07-05 | End: 2025-07-05

## 2025-07-05 RX ORDER — DIPHENHYDRAMINE HYDROCHLORIDE 50 MG/ML
25 INJECTION, SOLUTION INTRAMUSCULAR; INTRAVENOUS EVERY 6 HOURS PRN
Status: DISCONTINUED | OUTPATIENT
Start: 2025-07-05 | End: 2025-07-06 | Stop reason: HOSPADM

## 2025-07-05 RX ORDER — MISOPROSTOL 200 UG/1
800 TABLET ORAL ONCE AS NEEDED
Status: DISCONTINUED | OUTPATIENT
Start: 2025-07-05 | End: 2025-07-06 | Stop reason: HOSPADM

## 2025-07-05 RX ORDER — OXYTOCIN 10 [USP'U]/ML
10 INJECTION, SOLUTION INTRAMUSCULAR; INTRAVENOUS ONCE AS NEEDED
Status: DISCONTINUED | OUTPATIENT
Start: 2025-07-05 | End: 2025-07-05

## 2025-07-05 RX ORDER — SIMETHICONE 80 MG
80 TABLET,CHEWABLE ORAL 4 TIMES DAILY PRN
Status: DISCONTINUED | OUTPATIENT
Start: 2025-07-05 | End: 2025-07-06 | Stop reason: HOSPADM

## 2025-07-05 RX ORDER — LABETALOL HYDROCHLORIDE 5 MG/ML
20 INJECTION, SOLUTION INTRAVENOUS ONCE AS NEEDED
Status: DISCONTINUED | OUTPATIENT
Start: 2025-07-05 | End: 2025-07-06 | Stop reason: HOSPADM

## 2025-07-05 RX ORDER — CARBOPROST TROMETHAMINE 250 UG/ML
250 INJECTION, SOLUTION INTRAMUSCULAR ONCE AS NEEDED
Status: DISCONTINUED | OUTPATIENT
Start: 2025-07-05 | End: 2025-07-06 | Stop reason: HOSPADM

## 2025-07-05 RX ORDER — DIPHENHYDRAMINE HCL 25 MG
25 CAPSULE ORAL EVERY 6 HOURS PRN
Status: DISCONTINUED | OUTPATIENT
Start: 2025-07-05 | End: 2025-07-06 | Stop reason: HOSPADM

## 2025-07-05 RX ORDER — TRANEXAMIC ACID 1 G/10ML
1000 INJECTION, SOLUTION INTRAVENOUS ONCE AS NEEDED
Status: DISCONTINUED | OUTPATIENT
Start: 2025-07-05 | End: 2025-07-06 | Stop reason: HOSPADM

## 2025-07-05 RX ORDER — OXYTOCIN 10 [USP'U]/ML
10 INJECTION, SOLUTION INTRAMUSCULAR; INTRAVENOUS ONCE AS NEEDED
Status: DISCONTINUED | OUTPATIENT
Start: 2025-07-05 | End: 2025-07-06 | Stop reason: HOSPADM

## 2025-07-05 RX ORDER — OXYTOCIN/0.9 % SODIUM CHLORIDE 30/500 ML
60 PLASTIC BAG, INJECTION (ML) INTRAVENOUS ONCE AS NEEDED
Status: DISCONTINUED | OUTPATIENT
Start: 2025-07-05 | End: 2025-07-05

## 2025-07-05 RX ORDER — OXYTOCIN/0.9 % SODIUM CHLORIDE 30/500 ML
60 PLASTIC BAG, INJECTION (ML) INTRAVENOUS ONCE AS NEEDED
Status: DISCONTINUED | OUTPATIENT
Start: 2025-07-05 | End: 2025-07-06 | Stop reason: HOSPADM

## 2025-07-05 RX ORDER — TRANEXAMIC ACID 1 G/10ML
1000 INJECTION, SOLUTION INTRAVENOUS ONCE AS NEEDED
Status: DISCONTINUED | OUTPATIENT
Start: 2025-07-05 | End: 2025-07-05

## 2025-07-05 RX ORDER — LABETALOL HYDROCHLORIDE 5 MG/ML
20 INJECTION, SOLUTION INTRAVENOUS ONCE AS NEEDED
Status: DISCONTINUED | OUTPATIENT
Start: 2025-07-05 | End: 2025-07-05

## 2025-07-05 RX ORDER — POLYETHYLENE GLYCOL 3350 17 G/17G
17 POWDER, FOR SOLUTION ORAL 2 TIMES DAILY PRN
Status: DISCONTINUED | OUTPATIENT
Start: 2025-07-05 | End: 2025-07-06 | Stop reason: HOSPADM

## 2025-07-05 RX ORDER — HYDRALAZINE HYDROCHLORIDE 20 MG/ML
5 INJECTION INTRAMUSCULAR; INTRAVENOUS ONCE AS NEEDED
Status: DISCONTINUED | OUTPATIENT
Start: 2025-07-05 | End: 2025-07-06 | Stop reason: HOSPADM

## 2025-07-05 RX ORDER — IBUPROFEN 600 MG/1
600 TABLET, FILM COATED ORAL EVERY 6 HOURS
Status: DISCONTINUED | OUTPATIENT
Start: 2025-07-05 | End: 2025-07-06 | Stop reason: HOSPADM

## 2025-07-05 RX ORDER — ONDANSETRON HYDROCHLORIDE 2 MG/ML
4 INJECTION, SOLUTION INTRAVENOUS EVERY 6 HOURS PRN
Status: DISCONTINUED | OUTPATIENT
Start: 2025-07-05 | End: 2025-07-06 | Stop reason: HOSPADM

## 2025-07-05 RX ORDER — MISOPROSTOL 200 UG/1
800 TABLET ORAL ONCE AS NEEDED
Status: DISCONTINUED | OUTPATIENT
Start: 2025-07-05 | End: 2025-07-05

## 2025-07-05 RX ORDER — AMOXICILLIN 250 MG
2 CAPSULE ORAL NIGHTLY
Status: DISCONTINUED | OUTPATIENT
Start: 2025-07-05 | End: 2025-07-06 | Stop reason: HOSPADM

## 2025-07-05 RX ORDER — ACETAMINOPHEN 325 MG/1
975 TABLET ORAL EVERY 6 HOURS
Status: DISCONTINUED | OUTPATIENT
Start: 2025-07-05 | End: 2025-07-06 | Stop reason: HOSPADM

## 2025-07-05 RX ORDER — ONDANSETRON 4 MG/1
4 TABLET, FILM COATED ORAL EVERY 6 HOURS PRN
Status: DISCONTINUED | OUTPATIENT
Start: 2025-07-05 | End: 2025-07-05

## 2025-07-05 RX ORDER — METHYLERGONOVINE MALEATE 0.2 MG/ML
0.2 INJECTION INTRAVENOUS ONCE AS NEEDED
Status: DISCONTINUED | OUTPATIENT
Start: 2025-07-05 | End: 2025-07-05

## 2025-07-05 RX ORDER — ADHESIVE BANDAGE
10 BANDAGE TOPICAL
Status: DISCONTINUED | OUTPATIENT
Start: 2025-07-05 | End: 2025-07-06 | Stop reason: HOSPADM

## 2025-07-05 RX ORDER — ONDANSETRON HYDROCHLORIDE 2 MG/ML
4 INJECTION, SOLUTION INTRAVENOUS EVERY 6 HOURS PRN
Status: DISCONTINUED | OUTPATIENT
Start: 2025-07-05 | End: 2025-07-05

## 2025-07-05 RX ORDER — LIDOCAINE HYDROCHLORIDE 10 MG/ML
20 INJECTION, SOLUTION INFILTRATION; PERINEURAL ONCE AS NEEDED
Status: DISCONTINUED | OUTPATIENT
Start: 2025-07-05 | End: 2025-07-05

## 2025-07-05 RX ORDER — CARBOPROST TROMETHAMINE 250 UG/ML
250 INJECTION, SOLUTION INTRAMUSCULAR ONCE AS NEEDED
Status: DISCONTINUED | OUTPATIENT
Start: 2025-07-05 | End: 2025-07-05

## 2025-07-05 RX ORDER — SODIUM CHLORIDE, SODIUM LACTATE, POTASSIUM CHLORIDE, CALCIUM CHLORIDE 600; 310; 30; 20 MG/100ML; MG/100ML; MG/100ML; MG/100ML
75 INJECTION, SOLUTION INTRAVENOUS CONTINUOUS
Status: DISCONTINUED | OUTPATIENT
Start: 2025-07-05 | End: 2025-07-05

## 2025-07-05 RX ORDER — CALCIUM CARBONATE 200(500)MG
1 TABLET,CHEWABLE ORAL EVERY 6 HOURS PRN
Status: DISCONTINUED | OUTPATIENT
Start: 2025-07-05 | End: 2025-07-05

## 2025-07-05 RX ORDER — ONDANSETRON 4 MG/1
4 TABLET, FILM COATED ORAL EVERY 6 HOURS PRN
Status: DISCONTINUED | OUTPATIENT
Start: 2025-07-05 | End: 2025-07-06 | Stop reason: HOSPADM

## 2025-07-05 RX ORDER — TERBUTALINE SULFATE 1 MG/ML
0.25 INJECTION SUBCUTANEOUS ONCE AS NEEDED
Status: DISCONTINUED | OUTPATIENT
Start: 2025-07-05 | End: 2025-07-05

## 2025-07-05 RX ADMIN — ACETAMINOPHEN 975 MG: 325 TABLET, FILM COATED ORAL at 16:22

## 2025-07-05 RX ADMIN — Medication 1 EACH: at 03:36

## 2025-07-05 RX ADMIN — IBUPROFEN 600 MG: 600 TABLET ORAL at 03:36

## 2025-07-05 RX ADMIN — ACETAMINOPHEN 975 MG: 325 TABLET, FILM COATED ORAL at 20:56

## 2025-07-05 RX ADMIN — IBUPROFEN 600 MG: 600 TABLET ORAL at 09:56

## 2025-07-05 RX ADMIN — IBUPROFEN 600 MG: 600 TABLET ORAL at 20:56

## 2025-07-05 RX ADMIN — BENZOCAINE AND LEVOMENTHOL 1 APPLICATION: 200; 5 SPRAY TOPICAL at 03:36

## 2025-07-05 RX ADMIN — ACETAMINOPHEN 975 MG: 325 TABLET, FILM COATED ORAL at 03:36

## 2025-07-05 RX ADMIN — IBUPROFEN 600 MG: 600 TABLET ORAL at 16:22

## 2025-07-05 RX ADMIN — ACETAMINOPHEN 975 MG: 325 TABLET, FILM COATED ORAL at 09:56

## 2025-07-05 SDOH — HEALTH STABILITY: MENTAL HEALTH: SUICIDAL BEHAVIOR (LIFETIME): NO

## 2025-07-05 SDOH — SOCIAL STABILITY: SOCIAL INSECURITY
WITHIN THE LAST YEAR, HAVE YOU BEEN RAPED OR FORCED TO HAVE ANY KIND OF SEXUAL ACTIVITY BY YOUR PARTNER OR EX-PARTNER?: NO

## 2025-07-05 SDOH — HEALTH STABILITY: MENTAL HEALTH: NON-SPECIFIC ACTIVE SUICIDAL THOUGHTS (PAST 1 MONTH): NO

## 2025-07-05 SDOH — ECONOMIC STABILITY: FOOD INSECURITY: WITHIN THE PAST 12 MONTHS, THE FOOD YOU BOUGHT JUST DIDN'T LAST AND YOU DIDN'T HAVE MONEY TO GET MORE.: NEVER TRUE

## 2025-07-05 SDOH — SOCIAL STABILITY: SOCIAL INSECURITY: HAVE YOU HAD ANY THOUGHTS OF HARMING ANYONE ELSE?: NO

## 2025-07-05 SDOH — ECONOMIC STABILITY: FOOD INSECURITY: WITHIN THE PAST 12 MONTHS, YOU WORRIED THAT YOUR FOOD WOULD RUN OUT BEFORE YOU GOT THE MONEY TO BUY MORE.: NEVER TRUE

## 2025-07-05 SDOH — SOCIAL STABILITY: SOCIAL INSECURITY: WITHIN THE LAST YEAR, HAVE YOU BEEN HUMILIATED OR EMOTIONALLY ABUSED IN OTHER WAYS BY YOUR PARTNER OR EX-PARTNER?: NO

## 2025-07-05 SDOH — SOCIAL STABILITY: SOCIAL INSECURITY: WITHIN THE LAST YEAR, HAVE YOU BEEN AFRAID OF YOUR PARTNER OR EX-PARTNER?: NO

## 2025-07-05 SDOH — SOCIAL STABILITY: SOCIAL INSECURITY: DOES ANYONE TRY TO KEEP YOU FROM HAVING/CONTACTING OTHER FRIENDS OR DOING THINGS OUTSIDE YOUR HOME?: NO

## 2025-07-05 SDOH — HEALTH STABILITY: MENTAL HEALTH: WISH TO BE DEAD (PAST 1 MONTH): NO

## 2025-07-05 SDOH — SOCIAL STABILITY: SOCIAL INSECURITY: ARE YOU OR HAVE YOU BEEN THREATENED OR ABUSED PHYSICALLY, EMOTIONALLY, OR SEXUALLY BY ANYONE?: NO

## 2025-07-05 SDOH — SOCIAL STABILITY: SOCIAL INSECURITY: DO YOU FEEL ANYONE HAS EXPLOITED OR TAKEN ADVANTAGE OF YOU FINANCIALLY OR OF YOUR PERSONAL PROPERTY?: NO

## 2025-07-05 SDOH — SOCIAL STABILITY: SOCIAL INSECURITY: PHYSICAL ABUSE: DENIES

## 2025-07-05 SDOH — SOCIAL STABILITY: SOCIAL INSECURITY: ARE THERE ANY APPARENT SIGNS OF INJURIES/BEHAVIORS THAT COULD BE RELATED TO ABUSE/NEGLECT?: NO

## 2025-07-05 SDOH — SOCIAL STABILITY: SOCIAL INSECURITY
WITHIN THE LAST YEAR, HAVE YOU BEEN KICKED, HIT, SLAPPED, OR OTHERWISE PHYSICALLY HURT BY YOUR PARTNER OR EX-PARTNER?: NO

## 2025-07-05 SDOH — ECONOMIC STABILITY: HOUSING INSECURITY: DO YOU FEEL UNSAFE GOING BACK TO THE PLACE WHERE YOU ARE LIVING?: NO

## 2025-07-05 SDOH — HEALTH STABILITY: MENTAL HEALTH: HAVE YOU USED ANY SUBSTANCES (CANABIS, COCAINE, HEROIN, HALLUCINOGENS, INHALANTS, ETC.) IN THE PAST 12 MONTHS?: NO

## 2025-07-05 SDOH — HEALTH STABILITY: MENTAL HEALTH: WERE YOU ABLE TO COMPLETE ALL THE BEHAVIORAL HEALTH SCREENINGS?: YES

## 2025-07-05 SDOH — SOCIAL STABILITY: SOCIAL INSECURITY: HAVE YOU HAD THOUGHTS OF HARMING ANYONE ELSE?: NO

## 2025-07-05 SDOH — SOCIAL STABILITY: SOCIAL INSECURITY: VERBAL ABUSE: DENIES

## 2025-07-05 SDOH — HEALTH STABILITY: MENTAL HEALTH: HAVE YOU USED ANY PRESCRIPTION DRUGS OTHER THAN PRESCRIBED IN THE PAST 12 MONTHS?: NO

## 2025-07-05 SDOH — SOCIAL STABILITY: SOCIAL INSECURITY: ABUSE SCREEN: ADULT

## 2025-07-05 SDOH — SOCIAL STABILITY: SOCIAL INSECURITY: HAS ANYONE EVER THREATENED TO HURT YOUR FAMILY OR YOUR PETS?: NO

## 2025-07-05 ASSESSMENT — PATIENT HEALTH QUESTIONNAIRE - PHQ9
2. FEELING DOWN, DEPRESSED OR HOPELESS: NOT AT ALL
SUM OF ALL RESPONSES TO PHQ9 QUESTIONS 1 & 2: 0
1. LITTLE INTEREST OR PLEASURE IN DOING THINGS: NOT AT ALL

## 2025-07-05 ASSESSMENT — LIFESTYLE VARIABLES
AUDIT-C TOTAL SCORE: 0
HOW MANY STANDARD DRINKS CONTAINING ALCOHOL DO YOU HAVE ON A TYPICAL DAY: PATIENT DOES NOT DRINK
AUDIT-C TOTAL SCORE: 0
SKIP TO QUESTIONS 9-10: 1
HOW OFTEN DO YOU HAVE 6 OR MORE DRINKS ON ONE OCCASION: NEVER
HOW OFTEN DO YOU HAVE A DRINK CONTAINING ALCOHOL: NEVER

## 2025-07-05 ASSESSMENT — PAIN SCALES - GENERAL
PAINLEVEL_OUTOF10: 0 - NO PAIN
PAINLEVEL_OUTOF10: 3
PAINLEVEL_OUTOF10: 0 - NO PAIN
PAINLEVEL_OUTOF10: 0 - NO PAIN
PAINLEVEL_OUTOF10: 1

## 2025-07-05 ASSESSMENT — ACTIVITIES OF DAILY LIVING (ADL)
LACK_OF_TRANSPORTATION: NO
LACK_OF_TRANSPORTATION: NO

## 2025-07-05 NOTE — LACTATION NOTE
Lactation Consultant Note  Lactation Consultation  Reason for Consult: Initial assessment  Consultant Name: Danielle Mccain    Maternal Information  Has mother  before?: No  Infant to breast within first 2 hours of birth?: Yes  Exclusive Pump and Bottle Feed: No    Maternal Assessment  Breast Assessment: Large, Soft, Compressible  Nipple Assessment: Intact, Short, Erect  Areola Assessment: Normal    Infant Assessment  Infant Behavior: Light sleep    Feeding Assessment  Nutrition Source: Breastmilk  Feeding Method: Nursing at the breast  Feeding Position: Cross - cradle, C - hold  Suck/Feeding: Unsustained  Latch Assessment: No latch achieved    LATCH TOOL       Breast Pump       Other OB Lactation Tools       Patient Follow-up  Inpatient Lactation Follow-up Needed : Yes    Other OB Lactation Documentation       Recommendations/Summary  Checked in on mom this morning and she states breastfeeding is going well but that baby is having some trouble latching onto the right breast. Mom states baby had eaten 2 hours prior and only fed on the left breast. Mom tries to latch baby on the right during this visit but baby is uninterested. Mom very easily expressible and hand expresses about 5 drops of colostrum into baby's mouth at this time. Mom had questions about pumping so she was encouraged to hold off on pumping/bottle feeding until week 3 or 4 and to work on establishing the latch for now. Talked with mom about breastfeeding basics, feeding frequency, ways to encourage a sleepy baby at the breast, cluster feeding, diaper output, and milk coming in.. Encouraged mom to wake baby to feed in one hour or sooner and to offer the right side. Encouraged mom to call for assistance with latch at that time.

## 2025-07-05 NOTE — H&P
OB Admission H&P    Assessment/Plan    Gabby Neves is a 20 y.o.  at 39w4d, KIKA: 2025, by Ultrasound, who is admitted for Labor. Patient rapidly progressed to a spontaneous vaginal delivery in triage C. Will transition to postpartum care.    Plan   -Admit to L&D  -T&S, CBC, and Syphilis      Fetal Status  -NST reassuring   -Presentation cephalic based on vaginal exam  -EFW 6 lb by Leopolds  -GBS positive    Postpartum  Contraception Plan: patient declined    Pregnancy Problems (from 23 to present)       Problem Noted Diagnosed Resolved    Anemia during pregnancy in third trimester 2025 by Mayte Gonzalez MD  No    Priority:  Medium       Pregnancy, high-risk, third trimester ([facility]) 2025 by Mayte Gonzalez MD  No    Priority:  Medium       Poor fetal growth affecting management of mother in third trimester ([facility]) 2025 by Mayte Gonzalez MD  No    Priority:  Medium       Back pain in pregnancy ([facility]) 2025 by Mayte Gonzalez MD  No    Priority:  Medium               Subjective   Good fetal movement.  Having contractions q 3 minutes.  , Spontaneous rupture of membranes at home about 40 minutes prior to presentation.    After delivery upon review of EMR, FGR was noted with most recent EFW at 7%ile.    Prenatal Provider Dr. Gonzalez    OB History    Para Term  AB Living   1 0 0 0 0 0   SAB IAB Ectopic Multiple Live Births   0 0 0 0 0      # Outcome Date GA Lbr Francisco/2nd Weight Sex Type Anes PTL Lv   1 Current                Surgical History[1]    Social History     Tobacco Use    Smoking status: Never    Smokeless tobacco: Never   Substance Use Topics    Alcohol use: Not Currently     Alcohol/week: 1.0 standard drink of alcohol     Types: 1 Standard drinks or equivalent per week     Comment: Occasionally not often       Allergies[2]    Prescriptions Prior to Admission[3]  Objective     Last Vitals  Temp Pulse Resp BP MAP O2 Sat     92       (!) 88 %     Blood Pressures     No data found in the last 1 encounters.          Physical Exam  General: NAD, mood appropriate  Cardiopulmonary: warm and well perfused, breathing comfortably on room air  Abdomen: Gravid, non-tender  Extremities: Symmetric  Speculum Exam: deferred  Cervix: complete  /100  / 0     Chaperone Present: Yes.  Chaperone Name/Title: RN  Examination Chaperoned: Entire Physical Exam     Fetal Monitoring  Baseline: 160 bpm, Variability: moderate,  Accelerations: absent and Decelerations: none  Contractions are palpated and estimated to be every 2 minutes  Interpretation: Category 2    Bedside ultrasound: No    Labs in chart were reviewed.  0   Lab Value Date/Time    GRPBSTREP SEE NOTE (A) 06/12/2025 1003             Prenatal labs reviewed, not remarkable.  Iron deficiency anemia is noted.           [1]   Past Surgical History:  Procedure Laterality Date    OTHER SURGICAL HISTORY  12/28/2021    Oral surgery   [2] No Known Allergies  [3]   Medications Prior to Admission   Medication Sig Dispense Refill Last Dose/Taking    aspirin 81 mg EC tablet Take 2 tablets (162 mg) by mouth once daily. 180 tablet 3     docusate sodium (Colace) 100 mg capsule Take 1 capsule (100 mg) by mouth 2 times a day as needed for constipation. 30 capsule 5     ferrous sulfate 325 (65 Fe) MG EC tablet Take 1 tablet by mouth 3 times daily (morning, midday, late afternoon). Do not crush, chew, or split. 90 tablet 11

## 2025-07-05 NOTE — CARE PLAN
Problem: Postpartum  Goal: Experiences normal postpartum course  Outcome: Progressing  Goal: Appropriate maternal -  bonding  Outcome: Progressing  Goal: No s/sx of hemorrhage  Outcome: Progressing   The patient's goals for the shift include      The clinical goals for the shift include have a baby    Over the shift, the patient did make progress toward the following goals.

## 2025-07-05 NOTE — L&D DELIVERY NOTE
Vaginal Delivery Note    Patient Name: Gabby Neves  : 2005  MRN: [id number]  Age: 20 y.o.    /Para:   Gestational Age: 39w4d    Date of Delivery: 2025    Procedure: Normal Spontaneous Vaginal Delivery    Delivery Provider: Dr. Rueda    Resident/Fellow/Other Assistant: none    Description of Procedure:  Delivery of viable infant under no anesthesia. Delayed clamping was performed per patient request well beyond 3 minutes. The infant was placed skin to skin. Cord gases were not sent.  Cord blood was collected. Placenta delivered intact and fundus was firm. Patient refused pitocin after delivery.    Right labial Laceration identified and patient declined repair.    Additional Procedures:  None    Findings:   Amniotic fluid Clear, Female infant in Vertex Occiput Anterior presentation, APGARS 8 , 9 .  Birth Weight 2.905 kg.    Complications: None    Quantitative Blood Loss:   Delivery QBL: 200 mL (2025  2:11 AM - 2025  5:25 AM)    Blood products:      Uterotonics/Hemostatic Agent: refused by the patient.    Specimen:   Placenta  Delivered: 2025  2:25 AM  Appearance: Intact  Removal: Spontaneous    Disposition: discarded    Sponge/Instrument/Needle Counts: The sponge, lap and needle counts were correct.    Patient Disposition: Patient recovering on labor and delivery in stable condition.    NevesHao [[id number]]      Labor Events    Sac identifier: Sac 1  Rupture date/time: 2025 0000  Rupture type: Spontaneous  Fluid color: Clear  Fluid odor: None  Labor type: Spontaneous Onset of Labor  Labor allowed to proceed with plans for an attempted vaginal birth?: Yes  Augmentation: None  Complications: None       Labor Event Times    Dilation complete date/time: 2025 0155  Start pushing date/time: 2025 01:59       Labor Length    2nd stage: 0h 16m  3rd stage: 0h 14m       Placenta    Placenta delivery date/time: 2025 02:25  Placenta removal:  Spontaneous  Placenta appearance: Intact  Placenta disposition: discarded       Cord    Vessels: 3 vessels  Complications: None  Delayed cord clamping?: Yes  Cord clamped date/time: 2025 02:20:00  Cord blood disposition: Discarded  Gases sent?: No  Stem cell collection (by provider): No       Lacerations    Episiotomy: None  Labial laceration?: Yes  Labial laceration location: right  Labial laceration repaired?: No  Repair suture: None       Anesthesia    Method: None       Operative Delivery    Forceps attempted?: No  Vacuum extractor attempted?: No       Shoulder Dystocia    Shoulder dystocia present?: No       [place] Delivery    Time head delivered: 2025 02:11:00  Birth date/time: 2025 02:11:00  Delivery type: Vaginal, Spontaneous  Complications: None       Resuscitation    Method: Suctioning, Tactile stimulation       Apgars    Living status: Living  Apgar Component Scores:  1 min.:  5 min.:  10 min.:  15 min.:  20 min.:    Skin color:  0  1       Heart rate:  2  2       Reflex irritability:  2  2       Muscle tone:  2  2       Respiratory effort:  2  2       Total:  8  9       Apgars assigned by: UVALDO CRAIG       Delivery Providers    Delivering clinician: Ramona Rueda MD   Provider Role    Zuleyka Galvin, DARYN Delivery Nurse    Sony Craig, RN Nursery Nurse    Deana Briggs RN Charge Nurse

## 2025-07-05 NOTE — CARE PLAN
Problem: Postpartum  Goal: Experiences normal postpartum course  Outcome: Progressing  Goal: Appropriate maternal -  bonding  Outcome: Progressing  Goal: No s/sx of hemorrhage  Outcome: Progressing   The patient's goals for the shift include shower and Breast feed    The clinical goals for the shift include rest and bond with NB

## 2025-07-06 VITALS
HEART RATE: 73 BPM | TEMPERATURE: 97.5 F | DIASTOLIC BLOOD PRESSURE: 70 MMHG | OXYGEN SATURATION: 98 % | RESPIRATION RATE: 18 BRPM | SYSTOLIC BLOOD PRESSURE: 111 MMHG

## 2025-07-06 LAB — TREPONEMA PALLIDUM IGG+IGM AB [PRESENCE] IN SERUM OR PLASMA BY IMMUNOASSAY: NONREACTIVE

## 2025-07-06 PROCEDURE — 2500000001 HC RX 250 WO HCPCS SELF ADMINISTERED DRUGS (ALT 637 FOR MEDICARE OP): Performed by: ADVANCED PRACTICE MIDWIFE

## 2025-07-06 RX ORDER — IBUPROFEN 600 MG/1
600 TABLET, FILM COATED ORAL EVERY 6 HOURS
Qty: 60 TABLET | Refills: 1 | Status: SHIPPED | OUTPATIENT
Start: 2025-07-06

## 2025-07-06 RX ORDER — ACETAMINOPHEN 325 MG/1
650 TABLET ORAL EVERY 6 HOURS
Qty: 60 TABLET | Refills: 1 | Status: SHIPPED | OUTPATIENT
Start: 2025-07-06

## 2025-07-06 RX ADMIN — ACETAMINOPHEN 975 MG: 325 TABLET, FILM COATED ORAL at 10:07

## 2025-07-06 RX ADMIN — ACETAMINOPHEN 975 MG: 325 TABLET, FILM COATED ORAL at 03:11

## 2025-07-06 RX ADMIN — IBUPROFEN 600 MG: 600 TABLET ORAL at 10:07

## 2025-07-06 RX ADMIN — IBUPROFEN 600 MG: 600 TABLET ORAL at 03:11

## 2025-07-06 ASSESSMENT — PAIN SCALES - GENERAL: PAINLEVEL_OUTOF10: 0 - NO PAIN

## 2025-07-06 NOTE — LACTATION NOTE
Lactation Consultant Note  Lactation Consultation  Reason for Consult: Follow-up assessment  Consultant Name: Danielle Mccain    Maternal Information  Has mother  before?: No  Infant to breast within first 2 hours of birth?: Yes  Exclusive Pump and Bottle Feed: No    Maternal Assessment  Breast Assessment: Large, Soft, Compressible  Nipple Assessment: Intact, Erect  Areola Assessment: Normal    Infant Assessment  Infant Behavior: Deep sleep    Feeding Assessment  Nutrition Source: Breastmilk  Feeding Method: Nursing at the breast    LATCH TOOL       Breast Pump       Other OB Lactation Tools       Patient Follow-up  Inpatient Lactation Follow-up Needed : No    Other OB Lactation Documentation       Recommendations/Summary  Checked in with mom this morning as she prepares for discharge home. Mom states breastfeeding is going well. Mom has some nipple soreness so discussed proper nipple care and importance of deep latch/ways to achieve deep latch. Talked through mom's lansinoh pump giving basic instructions and sized her for 21 mm flanges. Reminded mom of outpatient lactation services and encouraged her to call for latch assistance or with any other questions before going home.

## 2025-07-06 NOTE — CARE PLAN
Problem: Postpartum  Goal: Experiences normal postpartum course  Outcome: Adequate for Discharge  Goal: Appropriate maternal -  bonding  Outcome: Adequate for Discharge  Goal: No s/sx of hemorrhage  Outcome: Adequate for Discharge

## 2025-07-06 NOTE — DISCHARGE SUMMARY
Discharge Summary    Admission Date: 7/5/2025  Discharge Date: 7/6/2025      Discharge Diagnosis  Labor without complication ([facility])    Hospital Course  Delivery Date: 7/5/2025 2:11 AM  Delivery type: Vaginal, Spontaneous   GA at delivery: 39w4d  Outcome: Living  Anesthesia during delivery: None  Intrapartum complications: None  Feeding method: Breastfeeding Status: Yes     Procedures: none  Contraception at discharge: none  Will discuss with private attending at 4-6 week appointment    Pertinent Physical Exam At Time of Discharge    General: Examination reveals a well developed, well nourished, female, in no acute distress. She is alert and cooperative.  Cardiac: regular rate and rhythm, S1, S2 normal, no murmur, click, rub or gallop.  Breasts: non-tender, intact.  Fundus: firm and below umbilicus.  Perineum: well healing.  Extremities: no redness or tenderness in the calves or thighs, no edema.  Neurological: alert, oriented, normal speech, no focal findings or movement disorder noted.  Psychological: awake and alert; oriented to person, place, and time.    Last Vitals:  Temp Pulse Resp BP MAP Pulse Ox   36.2 °C (97.2 °F) 68 18 120/80 88 97 %     Discharge Meds     Your medication list        START taking these medications        Instructions Last Dose Given Next Dose Due   acetaminophen 325 mg tablet  Commonly known as: Tylenol      Take 2 tablets (650 mg) by mouth every 6 hours.       ibuprofen 600 mg tablet      Take 1 tablet (600 mg) by mouth every 6 hours.       lanolin cream  Commonly known as: Lansinoh      Apply 1 Application topically if needed for dry skin.              CONTINUE taking these medications        Instructions Last Dose Given Next Dose Due   docusate sodium 100 mg capsule  Commonly known as: Colace      Take 1 capsule (100 mg) by mouth 2 times a day as needed for constipation.              STOP taking these medications      aspirin 81 mg EC tablet        ferrous sulfate 325 (65 Fe) mg EC  tablet                  Where to Get Your Medications        These medications were sent to [facility]  [address], [facility] [address]      Hours: 8 AM to 6 PM Mon-Fri, 9 AM to 1 PM Saturday Phone: [phone]   acetaminophen 325 mg tablet  ibuprofen 600 mg tablet       Information about where to get these medications is not yet available    Ask your nurse or doctor about these medications  lanolin cream          Complications Requiring Follow-Up  N/A     Test Results Pending At Discharge  Pending Labs       Order Current Status    Syphilis Screen with Reflex In process            Outpatient Follow-Up  No future appointments.    I spent 15 minutes in the professional and overall care of this patient.      Kathleen Willams, APRN-KALIA

## 2025-07-07 ENCOUNTER — TELEPHONE (OUTPATIENT)
Dept: OBSTETRICS AND GYNECOLOGY | Facility: CLINIC | Age: 20
End: 2025-07-07
Payer: COMMERCIAL

## 2025-07-08 NOTE — PROGRESS NOTES
She reports good FM and is w/o sx of PTL or PEC.     She is ready for her IOL.     She is s/p iron infusion.    A/P: [facility]OB - FGR and anemia    -  RTC PP

## 2025-09-02 ENCOUNTER — TELEPHONE (OUTPATIENT)
Dept: OBSTETRICS AND GYNECOLOGY | Facility: CLINIC | Age: 20
End: 2025-09-02
Payer: COMMERCIAL